# Patient Record
Sex: FEMALE | Race: WHITE | Employment: FULL TIME | URBAN - NONMETROPOLITAN AREA
[De-identification: names, ages, dates, MRNs, and addresses within clinical notes are randomized per-mention and may not be internally consistent; named-entity substitution may affect disease eponyms.]

---

## 2020-02-04 LAB
T3 TOTAL: 4.01
T4 FREE: 1.23
TSH SERPL DL<=0.05 MIU/L-ACNC: 0.25 UIU/ML

## 2020-10-21 LAB
ALBUMIN SERPL-MCNC: 4.3 G/DL
ALP BLD-CCNC: 53 U/L
ALT SERPL-CCNC: 8 U/L
ANION GAP SERPL CALCULATED.3IONS-SCNC: 10 MMOL/L
AST SERPL-CCNC: 11 U/L
BASOPHILS ABSOLUTE: 0 /ΜL
BASOPHILS RELATIVE PERCENT: 0.5 %
BILIRUB SERPL-MCNC: 0.3 MG/DL (ref 0.1–1.4)
BUN BLDV-MCNC: 9 MG/DL
CALCIUM SERPL-MCNC: 9 MG/DL
CHLORIDE BLD-SCNC: 105 MMOL/L
CO2: 27 MMOL/L
CREAT SERPL-MCNC: 0.5 MG/DL
EOSINOPHILS ABSOLUTE: 0.1 /ΜL
EOSINOPHILS RELATIVE PERCENT: 1.4 %
GFR CALCULATED: >60
GLUCOSE BLD-MCNC: 82 MG/DL
HCT VFR BLD CALC: 37.4 % (ref 36–46)
HEMOGLOBIN: 13.2 G/DL (ref 12–16)
LYMPHOCYTES ABSOLUTE: 2.8 /ΜL
LYMPHOCYTES RELATIVE PERCENT: 36.9 %
MCH RBC QN AUTO: 32.7 PG
MCHC RBC AUTO-ENTMCNC: 35.4 G/DL
MCV RBC AUTO: 92.3 FL
MONOCYTES ABSOLUTE: 0.6 /ΜL
MONOCYTES RELATIVE PERCENT: 7.7 %
NEUTROPHILS ABSOLUTE: 4 /ΜL
NEUTROPHILS RELATIVE PERCENT: 53.5 %
PDW BLD-RTO: 12.9 %
PLATELET # BLD: 173 K/ΜL
PMV BLD AUTO: 8.7 FL
POTASSIUM SERPL-SCNC: 3.6 MMOL/L
RBC # BLD: 4.05 10^6/ΜL
SODIUM BLD-SCNC: 138 MMOL/L
TOTAL PROTEIN: 6.6
WBC # BLD: 7.5 10^3/ML

## 2021-03-08 ENCOUNTER — OFFICE VISIT (OUTPATIENT)
Dept: FAMILY MEDICINE CLINIC | Age: 36
End: 2021-03-08
Payer: COMMERCIAL

## 2021-03-08 VITALS
SYSTOLIC BLOOD PRESSURE: 100 MMHG | OXYGEN SATURATION: 97 % | DIASTOLIC BLOOD PRESSURE: 62 MMHG | BODY MASS INDEX: 17.56 KG/M2 | TEMPERATURE: 97.7 F | HEART RATE: 94 BPM | WEIGHT: 105.4 LBS | RESPIRATION RATE: 16 BRPM | HEIGHT: 65 IN

## 2021-03-08 DIAGNOSIS — E07.9 THYROID DISEASE: ICD-10-CM

## 2021-03-08 DIAGNOSIS — E78.5 HYPERLIPIDEMIA, UNSPECIFIED HYPERLIPIDEMIA TYPE: ICD-10-CM

## 2021-03-08 DIAGNOSIS — R10.33 PERIUMBILICAL ABDOMINAL PAIN: ICD-10-CM

## 2021-03-08 DIAGNOSIS — C80.1 CANCER (HCC): ICD-10-CM

## 2021-03-08 DIAGNOSIS — J45.20 MILD INTERMITTENT ASTHMA WITHOUT COMPLICATION: ICD-10-CM

## 2021-03-08 DIAGNOSIS — Z11.59 NEED FOR HEPATITIS C SCREENING TEST: ICD-10-CM

## 2021-03-08 DIAGNOSIS — E83.51 HYPOCALCEMIA: ICD-10-CM

## 2021-03-08 DIAGNOSIS — Z00.00 ENCOUNTER FOR MEDICAL EXAMINATION TO ESTABLISH CARE: Primary | ICD-10-CM

## 2021-03-08 DIAGNOSIS — R53.83 OTHER FATIGUE: ICD-10-CM

## 2021-03-08 DIAGNOSIS — Z11.4 SCREENING FOR HIV (HUMAN IMMUNODEFICIENCY VIRUS): ICD-10-CM

## 2021-03-08 PROBLEM — G89.29 CHRONIC PAIN: Status: ACTIVE | Noted: 2021-03-08

## 2021-03-08 PROBLEM — N20.0 KIDNEY STONE: Status: ACTIVE | Noted: 2021-03-08

## 2021-03-08 PROBLEM — C73 MALIGNANT TUMOR OF THYROID GLAND (HCC): Status: ACTIVE | Noted: 2021-03-08

## 2021-03-08 PROCEDURE — 99204 OFFICE O/P NEW MOD 45 MIN: CPT | Performed by: NURSE PRACTITIONER

## 2021-03-08 RX ORDER — ALBUTEROL SULFATE 90 UG/1
2 AEROSOL, METERED RESPIRATORY (INHALATION) EVERY 4 HOURS PRN
Qty: 1 INHALER | Refills: 1 | Status: SHIPPED | OUTPATIENT
Start: 2021-03-08

## 2021-03-08 RX ORDER — PANTOPRAZOLE SODIUM 40 MG/1
TABLET, DELAYED RELEASE ORAL
COMMUNITY
Start: 2021-02-23 | End: 2021-03-15 | Stop reason: ALTCHOICE

## 2021-03-08 ASSESSMENT — PATIENT HEALTH QUESTIONNAIRE - PHQ9
2. FEELING DOWN, DEPRESSED OR HOPELESS: 0
SUM OF ALL RESPONSES TO PHQ QUESTIONS 1-9: 0
SUM OF ALL RESPONSES TO PHQ9 QUESTIONS 1 & 2: 0
SUM OF ALL RESPONSES TO PHQ QUESTIONS 1-9: 0
SUM OF ALL RESPONSES TO PHQ QUESTIONS 1-9: 0

## 2021-03-08 ASSESSMENT — ENCOUNTER SYMPTOMS
SHORTNESS OF BREATH: 0
ABDOMINAL PAIN: 0
EYE ITCHING: 0
COUGH: 0
CHEST TIGHTNESS: 0
CONSTIPATION: 0
CHOKING: 0
NAUSEA: 0
BACK PAIN: 0
EYE PAIN: 0
COLOR CHANGE: 0
ABDOMINAL DISTENTION: 0
VOICE CHANGE: 0
EYE DISCHARGE: 0
SINUS PRESSURE: 0
WHEEZING: 0
VOMITING: 0

## 2021-03-08 NOTE — PROGRESS NOTES
atraumatic. Right Ear: Tympanic membrane and external ear normal. Tympanic membrane is not injected or erythematous. Left Ear: Tympanic membrane and external ear normal. Tympanic membrane is not injected or erythematous. Nose: Nose normal. No congestion. Mouth/Throat:      Pharynx: Oropharynx is clear. Uvula midline. Eyes:      General:         Right eye: No discharge. Left eye: No discharge. Conjunctiva/sclera: Conjunctivae normal.      Pupils: Pupils are equal, round, and reactive to light. Neck:      Musculoskeletal: Full passive range of motion without pain, normal range of motion and neck supple. Thyroid: No thyromegaly. Trachea: Trachea normal.   Cardiovascular:      Rate and Rhythm: Normal rate and regular rhythm. Pulses: Normal pulses. Heart sounds: Normal heart sounds, S1 normal and S2 normal. No murmur. No friction rub. No gallop. Pulmonary:      Effort: Pulmonary effort is normal. No respiratory distress. Breath sounds: Normal breath sounds. No wheezing or rales. Chest:      Chest wall: No tenderness. Abdominal:      General: A surgical scar is present. Bowel sounds are normal. There is no distension. Palpations: Abdomen is soft. There is no mass. Tenderness: There is abdominal tenderness in the periumbilical area. There is no right CVA tenderness, left CVA tenderness, guarding or rebound. Comments: Small healing surgical incisions noted to abdomen, no sign of erythema or infection noted. Musculoskeletal: Normal range of motion. General: No tenderness or deformity. Lymphadenopathy:      Cervical: No cervical adenopathy. Skin:     General: Skin is warm and dry. Capillary Refill: Capillary refill takes less than 2 seconds. Comments: Abdomen with surgical incision. Neurological:      General: No focal deficit present. Mental Status: She is alert and oriented to person, place, and time. Deep Tendon Reflexes: Reflexes are normal and symmetric. An electronic signature was used to authenticate this note.     --SILVIO Mendez - CNP

## 2021-03-08 NOTE — PROGRESS NOTES
Health Maintenance Due   Topic Date Due    Hepatitis C screen  Never done    Varicella vaccine (1 of 2 - 2-dose childhood series) Never done    Pneumococcal 0-64 years Vaccine (1 of 1 - PPSV23) Never done    HIV screen  Never done    DTaP/Tdap/Td vaccine (1 - Tdap) Never done patient is due and declines.  Cervical cancer screen  Never done patient had completed frestyl.

## 2021-03-09 LAB
ANION GAP SERPL CALCULATED.3IONS-SCNC: 9 MEQ/L (ref 8–16)
BUN BLDV-MCNC: 9 MG/DL (ref 7–22)
CALCIUM SERPL-MCNC: 9 MG/DL (ref 8.5–10.5)
CHLORIDE BLD-SCNC: 102 MEQ/L (ref 98–111)
CHOLESTEROL, FASTING: 180 MG/DL (ref 100–199)
CO2: 27 MEQ/L (ref 23–33)
CREAT SERPL-MCNC: 0.6 MG/DL (ref 0.4–1.2)
GFR SERPL CREATININE-BSD FRML MDRD: > 90 ML/MIN/1.73M2
GLUCOSE BLD-MCNC: 82 MG/DL (ref 70–108)
HDLC SERPL-MCNC: 42 MG/DL
HEPATITIS C ANTIBODY: NEGATIVE
HIV AG/AB: NONREACTIVE
LDL CHOLESTEROL CALCULATED: 117 MG/DL
POTASSIUM SERPL-SCNC: 4.4 MEQ/L (ref 3.5–5.2)
SODIUM BLD-SCNC: 138 MEQ/L (ref 135–145)
TRIGLYCERIDE, FASTING: 107 MG/DL (ref 0–199)
VITAMIN D 25-HYDROXY: 12 NG/ML (ref 30–100)

## 2021-03-10 DIAGNOSIS — R79.89 LOW SERUM VITAMIN D: Primary | ICD-10-CM

## 2021-03-10 RX ORDER — MELATONIN
2000 DAILY
Qty: 180 TABLET | Refills: 0 | Status: SHIPPED | OUTPATIENT
Start: 2021-03-10 | End: 2022-04-29

## 2021-03-10 NOTE — RESULT ENCOUNTER NOTE
Please let Mague know that her Vitamin D level was very low at 12. I am sending an Rx for vitamin D for her to start daily.    All of her other labs looked great

## 2021-03-15 ENCOUNTER — OFFICE VISIT (OUTPATIENT)
Dept: FAMILY MEDICINE CLINIC | Age: 36
End: 2021-03-15
Payer: COMMERCIAL

## 2021-03-15 VITALS
BODY MASS INDEX: 17.36 KG/M2 | RESPIRATION RATE: 16 BRPM | OXYGEN SATURATION: 98 % | WEIGHT: 108 LBS | HEIGHT: 66 IN | DIASTOLIC BLOOD PRESSURE: 54 MMHG | TEMPERATURE: 97.9 F | HEART RATE: 97 BPM | SYSTOLIC BLOOD PRESSURE: 90 MMHG

## 2021-03-15 DIAGNOSIS — R31.9 HEMATURIA, UNSPECIFIED TYPE: Primary | ICD-10-CM

## 2021-03-15 DIAGNOSIS — Z90.49 STATUS POST CHOLECYSTECTOMY: ICD-10-CM

## 2021-03-15 DIAGNOSIS — R30.0 DYSURIA: ICD-10-CM

## 2021-03-15 LAB
BILIRUBIN, POC: NEGATIVE
BLOOD URINE, POC: NORMAL
CLARITY, POC: NORMAL
COLOR, POC: NORMAL
GLUCOSE URINE, POC: NEGATIVE
KETONES, POC: NEGATIVE
LEUKOCYTE EST, POC: NORMAL
NITRITE, POC: NEGATIVE
PH, POC: 6.5
PROTEIN, POC: NEGATIVE
SPECIFIC GRAVITY, POC: 1.02
UROBILINOGEN, POC: 0.2

## 2021-03-15 PROCEDURE — 81003 URINALYSIS AUTO W/O SCOPE: CPT | Performed by: FAMILY MEDICINE

## 2021-03-15 PROCEDURE — 99214 OFFICE O/P EST MOD 30 MIN: CPT | Performed by: FAMILY MEDICINE

## 2021-03-15 RX ORDER — SULFAMETHOXAZOLE AND TRIMETHOPRIM 800; 160 MG/1; MG/1
1 TABLET ORAL 2 TIMES DAILY
Qty: 10 TABLET | Refills: 0 | Status: SHIPPED | OUTPATIENT
Start: 2021-03-15 | End: 2021-03-20

## 2021-03-15 RX ORDER — MULTIVIT WITH CALCIUM,IRON,MIN 27MG-0.4MG
TABLET ORAL DAILY
COMMUNITY

## 2021-03-15 ASSESSMENT — ENCOUNTER SYMPTOMS
NAUSEA: 0
VOMITING: 0

## 2021-03-15 NOTE — PROGRESS NOTES
[address] [facility]  [address] [facility] [address]  Dept: [phone]  Dept Fax: [phone]  Loc: [phone]      Pacheco Ervin is a 43 y.o. female who presents todayfor Hematuria (x today ), Urinary Frequency (x today ), Dysuria (x today ), and Abdominal Pain (lower x today )      :   Urinary Tract Infection   This is a new problem. The current episode started today. The problem occurs every urination. The pain is at a severity of 3/10. The patient is experiencing no pain. There has been no fever. She is sexually active. There is no history of pyelonephritis. Associated symptoms include flank pain, frequency, hematuria and hesitancy. Pertinent negatives include no chills, discharge, nausea, possible pregnancy, sweats, urgency or vomiting. She has tried nothing for the symptoms. Her past medical history is significant for kidney stones (this does not feel like stone). Recently had gallbladder out. Endometriosis. patient is allergic to adhesive tape and morphine. Past MedicalHistory  Alyssia Stiles  has a past medical history of Asthma, Cancer ([facility] [address] Hyperlipidemia, Kidney stones, and Thyroid disease. Past Surgical History  The patient  has a past surgical history that includes Hysterectomy (2007); Lithotripsy (2007); Thyroidectomy (2014); EGD (2020); Colonoscopy; and Cholecystectomy. Family History  This patient's family history includes Cancer in her father and maternal grandfather; High Blood Pressure in her mother. Social History  Alyssia Stiles  reports that she has been smoking cigarettes. She has a 11.50 pack-year smoking history. She has never used smokeless tobacco. She reports current alcohol use. She reports that she does not use drugs.     Medications    Current Outpatient Medications:     Multiple Vitamins-Minerals (WOMENS ONE DAILY) TABS, Take by mouth daily, Disp: , Rfl:     sulfamethoxazole-trimethoprim (BACTRIM DS;SEPTRA DS) 800-160 MG per tablet, Take 1 tablet by mouth 2 times daily for 5 days, Disp: 10 tablet, Rfl: 0    albuterol sulfate HFA (VENTOLIN HFA) 108 (90 Base) MCG/ACT inhaler, Inhale 2 puffs into the lungs every 4 hours as needed for Wheezing, Disp: 1 Inhaler, Rfl: 1    levothyroxine (SYNTHROID) 100 MCG tablet, Take 100 mcg by mouth Daily, Disp: , Rfl:     ibuprofen (ADVIL;MOTRIN) 800 MG tablet, Take 800 mg by mouth every 6 hours as needed for Pain, Disp: , Rfl:     vitamin D3 (CHOLECALCIFEROL) 25 MCG (1000 UT) TABS tablet, Take 2 tablets by mouth daily (Patient not taking: Reported on 3/15/2021), Disp: 180 tablet, Rfl: 0    :     Review of Systems   Constitutional: Negative for chills and fever. Gastrointestinal: Negative for nausea and vomiting. Genitourinary: Positive for dysuria, flank pain, frequency, hematuria and hesitancy. Negative for urgency, vaginal bleeding and vaginal discharge. Neurological: Negative for dizziness.       :     Vitals:    03/15/21 1127   BP: (!) 90/54   Site: Left Upper Arm   Position: Sitting   Pulse: 97   Resp: 16   Temp: 97.9 °F (36.6 °C)   TempSrc: Temporal   SpO2: 98%   Weight: 108 lb (49 kg)   Height: 5' 5.5\" (1.664 m)       Physical Exam  Vitals signs reviewed. Constitutional:       Appearance: She is well-developed. HENT:      Head: Normocephalic and atraumatic. Eyes:      Conjunctiva/sclera: Conjunctivae normal.      Pupils: Pupils are equal, round, and reactive to light. Neck:      Musculoskeletal: Neck supple. Thyroid: No thyromegaly. Cardiovascular:      Rate and Rhythm: Normal rate and regular rhythm. Heart sounds: Normal heart sounds. Pulmonary:      Effort: Pulmonary effort is normal. No respiratory distress. Breath sounds: Normal breath sounds. Abdominal:      General: There is no distension. Palpations: Abdomen is soft. There is no mass. Tenderness: There is abdominal tenderness. There is no guarding or rebound. Hernia: No hernia is present. Comments: Status post recent cholecystectomy. Healing well. Mild induration at umbilicus between piercing site and port site without any drainage or warmth to suggest infection. Mild rash from surgical tape at this site also. Port placement sides clean, dry, and intact. Lymphadenopathy:      Cervical: No cervical adenopathy. Skin:     General: Skin is warm and dry. Findings: No rash. Neurological:      Mental Status: She is alert. Comments: No obvious focal deficit. Psychiatric:         Behavior: Behavior normal.         Assessment/Plan:   1. Hematuria, unspecified type  Symptoms and UA classic for UTI. Will treat empirically with bactrim and send off culture. Indications for prompt return and/or emergent presentation if worsening reviewed in detail. Can use pyridium in interim for discomfort if needed. - POCT Urinalysis No Micro (Auto)  - sulfamethoxazole-trimethoprim (BACTRIM DS;SEPTRA DS) 800-160 MG per tablet; Take 1 tablet by mouth 2 times daily for 5 days  Dispense: 10 tablet; Refill: 0  - Culture, Urine    2. Dysuria  As above. - sulfamethoxazole-trimethoprim (BACTRIM DS;SEPTRA DS) 800-160 MG per tablet; Take 1 tablet by mouth 2 times daily for 5 days  Dispense: 10 tablet; Refill: 0  - Culture, Urine    3. Status post cholecystectomy  Recent post op. Seems to be healing well. Follow-up with surgeon as planned about return to work instructions. Return if symptoms worsen or fail to improve. Also plans to return for pap with Geeta later this week. Unclear if pap indicated since status post hysterectomy with cervix removed. Would like to see records of prior paps and pathology report from hysterectomy. If pathology CIN2 or worse would need paps for 20 years post hysterectomy. If pathology less than this would no longer need paps.       Orders Placed  Orders Placed This Encounter   Procedures    Culture, Urine     Order Specific Question:   Specify (ex-cath, midstream, cysto, etc)? Answer:   mid-stream    POCT Urinalysis No Micro (Auto)       Prescriptions given/sent   Orders Placed This Encounter   Medications    sulfamethoxazole-trimethoprim (BACTRIM DS;SEPTRA DS) 800-160 MG per tablet     Sig: Take 1 tablet by mouth 2 times daily for 5 days     Dispense:  10 tablet     Refill:  0       Patient instructions given and reviewed. Discussed use, benefit, and side effects of prescribed medications. All patient questions answered. Pt voiced understanding.       Electronically signed by Elida Person MD on 3/15/2021at 11:49 AM

## 2021-03-15 NOTE — PATIENT INSTRUCTIONS
Patient Education        Urinary Tract Infection (UTI) in Women: Care Instructions  Overview     A urinary tract infection, or UTI, is a general term for an infection anywhere between the kidneys and the urethra (where urine comes out). Most UTIs are bladder infections. They often cause pain or burning when you urinate. UTIs are caused by bacteria and can be cured with antibiotics. Be sure to complete your treatment so that the infection does not get worse. Follow-up care is a key part of your treatment and safety. Be sure to make and go to all appointments, and call your doctor if you are having problems. It's also a good idea to know your test results and keep a list of the medicines you take. How can you care for yourself at home? · Take your antibiotics as directed. Do not stop taking them just because you feel better. You need to take the full course of antibiotics. · Drink extra water and other fluids for the next day or two. This will help make the urine less concentrated and help wash out the bacteria that are causing the infection. (If you have kidney, heart, or liver disease and have to limit fluids, talk with your doctor before you increase the amount of fluids you drink.)  · Avoid drinks that are carbonated or have caffeine. They can irritate the bladder. · Urinate often. Try to empty your bladder each time. · To relieve pain, take a hot bath or lay a heating pad set on low over your lower belly or genital area. Never go to sleep with a heating pad in place. To prevent UTIs  · Drink plenty of water each day. This helps you urinate often, which clears bacteria from your system. (If you have kidney, heart, or liver disease and have to limit fluids, talk with your doctor before you increase the amount of fluids you drink.)  · Urinate when you need to. · If you are sexually active, urinate right after you have sex. · Change sanitary pads often.   · Avoid douches, bubble baths, feminine hygiene

## 2021-03-17 ENCOUNTER — PROCEDURE VISIT (OUTPATIENT)
Dept: FAMILY MEDICINE CLINIC | Age: 36
End: 2021-03-17
Payer: COMMERCIAL

## 2021-03-17 VITALS
OXYGEN SATURATION: 99 % | DIASTOLIC BLOOD PRESSURE: 66 MMHG | SYSTOLIC BLOOD PRESSURE: 118 MMHG | HEART RATE: 88 BPM | HEIGHT: 66 IN | BODY MASS INDEX: 17.36 KG/M2 | WEIGHT: 108 LBS | TEMPERATURE: 97.5 F | RESPIRATION RATE: 18 BRPM

## 2021-03-17 DIAGNOSIS — N76.0 BACTERIAL VAGINOSIS: Primary | ICD-10-CM

## 2021-03-17 DIAGNOSIS — Z01.411 ENCOUNTER FOR GYNECOLOGICAL EXAMINATION WITH ABNORMAL FINDING: ICD-10-CM

## 2021-03-17 DIAGNOSIS — B96.89 BACTERIAL VAGINOSIS: Primary | ICD-10-CM

## 2021-03-17 LAB
ORGANISM: ABNORMAL
URINE CULTURE, ROUTINE: ABNORMAL

## 2021-03-17 PROCEDURE — 99214 OFFICE O/P EST MOD 30 MIN: CPT | Performed by: NURSE PRACTITIONER

## 2021-03-17 RX ORDER — METRONIDAZOLE 7.5 MG/G
1 GEL VAGINAL DAILY
Qty: 1 TUBE | Refills: 1 | Status: SHIPPED | OUTPATIENT
Start: 2021-03-17 | End: 2021-03-22

## 2021-03-17 NOTE — PROGRESS NOTES
Social History     Tobacco Use    Smoking status: Current Every Day Smoker     Packs/day: 0.50     Years: 23.00     Pack years: 11.50     Types: Cigarettes    Smokeless tobacco: Never Used   Substance Use Topics    Alcohol use: Yes     Comment: rarely        Vitals:    03/17/21 1148   BP: 118/66   Site: Left Upper Arm   Position: Sitting   Cuff Size: Medium Adult   Pulse: 88   Resp: 18   Temp: 97.5 °F (36.4 °C)   TempSrc: Temporal   SpO2: 99%   Weight: 108 lb (49 kg)   Height: 5' 5.5\" (1.664 m)     Estimated body mass index is 17.7 kg/m² as calculated from the following:    Height as of this encounter: 5' 5.5\" (1.664 m). Weight as of this encounter: 108 lb (49 kg). Physical Exam  Vitals signs and nursing note reviewed. Constitutional:       General: She is not in acute distress. Appearance: She is well-developed. She is not diaphoretic. HENT:      Head: Normocephalic and atraumatic. Right Ear: Tympanic membrane and external ear normal. Tympanic membrane is not injected or erythematous. Left Ear: Tympanic membrane and external ear normal. Tympanic membrane is not injected or erythematous. Nose: Nose normal.      Mouth/Throat:      Pharynx: Uvula midline. Eyes:      General:         Right eye: No discharge. Left eye: No discharge. Conjunctiva/sclera: Conjunctivae normal.      Pupils: Pupils are equal, round, and reactive to light. Neck:      Musculoskeletal: Full passive range of motion without pain, normal range of motion and neck supple. Thyroid: No thyromegaly. Trachea: Trachea normal.   Cardiovascular:      Rate and Rhythm: Normal rate and regular rhythm. Pulses: Normal pulses. Heart sounds: Normal heart sounds, S1 normal and S2 normal. No murmur. No friction rub. No gallop. Pulmonary:      Effort: Pulmonary effort is normal. No respiratory distress. Breath sounds: Normal breath sounds. No wheezing or rales.    Chest:      Chest wall: No tenderness. Abdominal:      General: Bowel sounds are normal. There is no distension. Palpations: Abdomen is soft. There is no mass. Tenderness: There is no abdominal tenderness. There is no guarding or rebound. Hernia: There is no hernia in the left inguinal area or right inguinal area. Genitourinary:     Exam position: Supine. Pubic Area: No rash or pubic lice. Labia:         Right: No rash, tenderness, lesion or injury. Left: No rash, tenderness, lesion or injury. Vagina: Vaginal discharge present. Adnexa: Right adnexa normal and left adnexa normal.      Rectum: Normal.      Comments: White discharge noted to vaginal canal.   Musculoskeletal: Normal range of motion. General: No tenderness or deformity. Lymphadenopathy:      Cervical: No cervical adenopathy. Lower Body: No right inguinal adenopathy. No left inguinal adenopathy. Skin:     General: Skin is warm and dry. Capillary Refill: Capillary refill takes less than 2 seconds. Neurological:      Mental Status: She is alert and oriented to person, place, and time. Deep Tendon Reflexes: Reflexes are normal and symmetric. ASSESSMENT/PLAN:    1. Encounter for gynecological examination with abnormal finding  See below     2. Bacterial vaginosis  Patient with complaints of vaginal burning, completed antibiotic, previously written. Exam consistent with BV, rx for metrogel written. - metroNIDAZOLE (METROGEL) 0.75 % vaginal gel; Place 1 Applicatorful vaginally daily for 5 days  Dispense: 1 Tube; Refill: 1  - Culture, Aerobic and Anaerobic        Return if symptoms worsen or fail to improve. An electronic signature was used to authenticate this note.     --SILVIO Vines - CNP on 3/26/2021 at 11:59 AM

## 2021-03-17 NOTE — PATIENT INSTRUCTIONS
Patient Education        Bacterial Vaginosis: Care Instructions  Overview     Bacterial vaginosis is a type of vaginal infection. It is caused by excess growth of certain bacteria that are normally found in the vagina. Symptoms can include itching, swelling, pain when you urinate or have sex, and a gray or yellow discharge with a \"fishy\" odor. It is not considered an infection that is spread through sexual contact. Symptoms can be annoying and uncomfortable. But bacterial vaginosis does not usually cause other health problems. However, if you have it while you are pregnant, it can cause complications. While the infection may go away on its own, most doctors use antibiotics to treat it. You may have been prescribed pills or vaginal cream. With treatment, bacterial vaginosis usually clears up in 5 to 7 days. Follow-up care is a key part of your treatment and safety. Be sure to make and go to all appointments, and call your doctor if you are having problems. It's also a good idea to know your test results and keep a list of the medicines you take. How can you care for yourself at home? · Take your antibiotics as directed. Do not stop taking them just because you feel better. You need to take the full course of antibiotics. · Do not eat or drink anything that contains alcohol if you are taking metronidazole or tinidazole. · Keep using your medicine if you start your period. Use pads instead of tampons while using a vaginal cream or suppository. Tampons can absorb the medicine. · Wear loose cotton clothing. Do not wear nylon and other materials that hold body heat and moisture close to the skin. · Do not scratch. Relieve itching with a cold pack or a cool bath. · Do not wash your vaginal area more than once a day. Use plain water or a mild, unscented soap. Do not douche. When should you call for help?   Watch closely for changes in your health, and be sure to contact your doctor if:    · You have unexpected vaginal bleeding.     · You have a fever.     · You have new or increased pain in your vagina or pelvis.     · You are not getting better after 1 week.     · Your symptoms return after you finish the course of your medicine. Where can you learn more? Go to https://chten.healthIlluminOss [email or web address]. org and sign in to your [employer] account. Enter M084 in the [facility] box to learn more about \"Bacterial Vaginosis: Care Instructions. \"     If you do not have an account, please click on the \"Sign Up Now\" link. Current as of: July 17, 2020               Content Version: 12.8  © 2006-2021 [employer]. Care instructions adapted under license by [facility] ([facility]). If you have questions about a medical condition or this instruction, always ask your healthcare professional. [address] any warranty or liability for your use of this information. Patient Education        Bacterial Vaginosis: Care Instructions  Overview     Bacterial vaginosis is a type of vaginal infection. It is caused by excess growth of certain bacteria that are normally found in the vagina. Symptoms can include itching, swelling, pain when you urinate or have sex, and a gray or yellow discharge with a \"fishy\" odor. It is not considered an infection that is spread through sexual contact. Symptoms can be annoying and uncomfortable. But bacterial vaginosis does not usually cause other health problems. However, if you have it while you are pregnant, it can cause complications. While the infection may go away on its own, most doctors use antibiotics to treat it. You may have been prescribed pills or vaginal cream. With treatment, bacterial vaginosis usually clears up in 5 to 7 days. Follow-up care is a key part of your treatment and safety. Be sure to make and go to all appointments, and call your doctor if you are having problems.  It's also a good idea to know your test results and keep a list of the medicines you take. How can you care for yourself at home? · Take your antibiotics as directed. Do not stop taking them just because you feel better. You need to take the full course of antibiotics. · Do not eat or drink anything that contains alcohol if you are taking metronidazole or tinidazole. · Keep using your medicine if you start your period. Use pads instead of tampons while using a vaginal cream or suppository. Tampons can absorb the medicine. · Wear loose cotton clothing. Do not wear nylon and other materials that hold body heat and moisture close to the skin. · Do not scratch. Relieve itching with a cold pack or a cool bath. · Do not wash your vaginal area more than once a day. Use plain water or a mild, unscented soap. Do not douche. When should you call for help? Watch closely for changes in your health, and be sure to contact your doctor if:    · You have unexpected vaginal bleeding.     · You have a fever.     · You have new or increased pain in your vagina or pelvis.     · You are not getting better after 1 week.     · Your symptoms return after you finish the course of your medicine. Where can you learn more? Go to https://Cloud[employer]fusion.[employer]. org and sign in to your [employer] account. Enter G669 in the [employer] box to learn more about \"Bacterial Vaginosis: Care Instructions. \"     If you do not have an account, please click on the \"Sign Up Now\" link. Current as of: July 17, 2020               Content Version: 12.8  © 2006-2021 Healthwise, [facility]. Care instructions adapted under license by [facility] ([facility]). If you have questions about a medical condition or this instruction, always ask your healthcare professional. [place] [address] any warranty or liability for your use of this information.

## 2021-03-22 ENCOUNTER — TELEPHONE (OUTPATIENT)
Dept: FAMILY MEDICINE CLINIC | Age: 36
End: 2021-03-22

## 2021-03-22 LAB
AEROBIC CULTURE: NORMAL
ANAEROBIC CULTURE: NORMAL
GRAM STAIN RESULT: NORMAL

## 2021-03-26 ASSESSMENT — ENCOUNTER SYMPTOMS
DIARRHEA: 0
FACIAL SWELLING: 0
SORE THROAT: 0
BACK PAIN: 0

## 2021-04-06 ENCOUNTER — OFFICE VISIT (OUTPATIENT)
Dept: FAMILY MEDICINE CLINIC | Age: 36
End: 2021-04-06
Payer: COMMERCIAL

## 2021-04-06 ENCOUNTER — TELEPHONE (OUTPATIENT)
Dept: FAMILY MEDICINE CLINIC | Age: 36
End: 2021-04-06

## 2021-04-06 VITALS
WEIGHT: 107.4 LBS | DIASTOLIC BLOOD PRESSURE: 62 MMHG | HEART RATE: 86 BPM | TEMPERATURE: 98.6 F | SYSTOLIC BLOOD PRESSURE: 104 MMHG | RESPIRATION RATE: 16 BRPM | BODY MASS INDEX: 17.6 KG/M2 | OXYGEN SATURATION: 99 %

## 2021-04-06 DIAGNOSIS — R30.0 DYSURIA: ICD-10-CM

## 2021-04-06 DIAGNOSIS — R82.71 BACTERIA IN URINE: Primary | ICD-10-CM

## 2021-04-06 DIAGNOSIS — N28.89 MULTIPLE PUNCTATE CALCIFICATIONS OF KIDNEY: ICD-10-CM

## 2021-04-06 DIAGNOSIS — R10.9 RIGHT FLANK PAIN: ICD-10-CM

## 2021-04-06 DIAGNOSIS — R31.9 HEMATURIA, UNSPECIFIED TYPE: ICD-10-CM

## 2021-04-06 LAB
BILIRUBIN URINE: ABNORMAL
BLOOD URINE, POC: ABNORMAL
CHARACTER, URINE: ABNORMAL
COLOR, URINE: ABNORMAL
GLUCOSE URINE: NEGATIVE MG/DL
KETONES, URINE: ABNORMAL
LEUKOCYTE CLUMPS, URINE: ABNORMAL
NITRITE, URINE: NEGATIVE
PH, URINE: 5.5 (ref 5–9)
PROTEIN, URINE: ABNORMAL MG/DL
SPECIFIC GRAVITY, URINE: 1.02 (ref 1–1.03)
UROBILINOGEN, URINE: 0.2 EU/DL (ref 0–1)

## 2021-04-06 PROCEDURE — 99214 OFFICE O/P EST MOD 30 MIN: CPT | Performed by: NURSE PRACTITIONER

## 2021-04-06 RX ORDER — CIPROFLOXACIN 500 MG/1
500 TABLET, FILM COATED ORAL 2 TIMES DAILY
Qty: 6 TABLET | Refills: 0 | Status: SHIPPED | OUTPATIENT
Start: 2021-04-06 | End: 2021-04-09

## 2021-04-06 ASSESSMENT — ENCOUNTER SYMPTOMS
RECTAL PAIN: 0
ABDOMINAL PAIN: 1
DIARRHEA: 0
VOMITING: 0
SORE THROAT: 0
NAUSEA: 0
CONSTIPATION: 0
SHORTNESS OF BREATH: 0
COUGH: 0

## 2021-04-06 NOTE — PROGRESS NOTES
Health Maintenance Due   Topic Date Due    DTaP/Tdap/Td vaccine (1 - Tdap) Never done patient is due - declines    Cervical cancer screen  Never done - patient had this completed here- 3 weeks ago?

## 2021-04-06 NOTE — TELEPHONE ENCOUNTER
LM for patient to return phone call.  Patient is scheduled for CT tomorrow at 12:45pm. Patient needs to arrive at 11:10am.

## 2021-04-06 NOTE — PROGRESS NOTES
Benjy Omalley (:  1985) is a 43 y.o. female,Established patient, here for evaluation of the following chief complaint(s):  Follow-up (UTI)      ASSESSMENT/PLAN:  1. Bacteria in urine  Urine dipstick shows negative for all components, positive for leukocytes, red blood cells. Treating with Cipro. KUB showed punctate calcifications kidney  Non toxic appearing and in no acute distress.   -     POCT Urinalysis No Micro (Auto)  -     Culture, Urine  -     ciprofloxacin (CIPRO) 500 MG tablet; Take 1 tablet by mouth 2 times daily for 3 days, Disp-6 tablet, R-0Normal  2. Hematuria, unspecified type  -     Urinalysis With Microscopic; Future  -     XR ABDOMEN (KUB) (SINGLE AP VIEW); Future  -     CT ABDOMEN PELVIS WO CONTRAST Additional Contrast? Radiologist Recommendation; Future  3. Right flank pain  -     XR ABDOMEN (KUB) (SINGLE AP VIEW); Future  -     CT ABDOMEN PELVIS WO CONTRAST Additional Contrast? Radiologist Recommendation; Future  4. Dysuria  -     Culture, Urine  5. Multiple punctate calcifications of kidney  KUb revealed: KUB showed punctate calcifications kidney  Ordering a CT abdomen for further evaluation   -     CT ABDOMEN PELVIS WO CONTRAST Additional Contrast? Radiologist Recommendation; Future      Return in about 1 week (around 2021), or if symptoms worsen or fail to improve. SUBJECTIVE/OBJECTIVE:  Treated with bactrim and x 5 days 3/15 for UTI. Symptoms resolved and then started again last week Thursday and Friday. Felt lower abdominal pressure. Early this am started having right flank pain. Now is having urgency. Urine is pink tinged. Has also been having pain with intercourse. Denies vaginal discharge. Had vaginal exam 3/17, treated for BV with metrogel x 5 days. Has had kidney stones in the past, years ago. Hx of hysterectomy, carmelo ovaries remain. Recent surgery in February for gallbladder removal with course of augmentin according to patient. Breath sounds: Normal breath sounds. No wheezing or rales. Chest:      Chest wall: No tenderness. Abdominal:      General: Abdomen is flat. Bowel sounds are normal. There is no distension. Palpations: Abdomen is soft. There is no mass. Tenderness: There is abdominal tenderness in the right lower quadrant and suprapubic area. There is right CVA tenderness. There is no left CVA tenderness, guarding or rebound. Musculoskeletal: Normal range of motion. General: No tenderness or deformity. Right lower leg: No edema. Left lower leg: No edema. Lymphadenopathy:      Cervical: No cervical adenopathy. Skin:     General: Skin is warm and dry. Capillary Refill: Capillary refill takes less than 2 seconds. Neurological:      General: No focal deficit present. Mental Status: She is alert and oriented to person, place, and time. Deep Tendon Reflexes: Reflexes are normal and symmetric. Psychiatric:         Mood and Affect: Mood normal.                 An electronic signature was used to authenticate this note.     --Jaimee Steel, SILVIO - CNP

## 2021-04-07 ENCOUNTER — HOSPITAL ENCOUNTER (OUTPATIENT)
Dept: CT IMAGING | Age: 36
Discharge: HOME | End: 2021-04-07
Payer: COMMERCIAL

## 2021-04-07 DIAGNOSIS — R31.9 HEMATURIA, UNSPECIFIED TYPE: ICD-10-CM

## 2021-04-07 DIAGNOSIS — R10.9 RIGHT FLANK PAIN: ICD-10-CM

## 2021-04-07 DIAGNOSIS — N28.89 MULTIPLE PUNCTATE CALCIFICATIONS OF KIDNEY: ICD-10-CM

## 2021-04-07 PROCEDURE — 74176 CT ABD & PELVIS W/O CONTRAST: CPT

## 2021-04-08 LAB
ORGANISM: ABNORMAL
URINE CULTURE, ROUTINE: ABNORMAL

## 2021-04-08 NOTE — RESULT ENCOUNTER NOTE
Please notify Mague that her CT showed some accumulation of calcium (build up of a stone) in her right ureter. And a left ovarian cyst. The cyst will likely resolve without any issues. Still awaiting the final result on her urine culture, but looks like she does in fact have a UTI. Has she seen a Urologist in the past? If not, does she have a preference?

## 2021-04-12 DIAGNOSIS — N28.89 MULTIPLE PUNCTATE CALCIFICATIONS OF KIDNEY: Primary | ICD-10-CM

## 2021-04-21 ENCOUNTER — OFFICE VISIT (OUTPATIENT)
Dept: UROLOGY | Age: 36
End: 2021-04-21
Payer: COMMERCIAL

## 2021-04-21 ENCOUNTER — TELEPHONE (OUTPATIENT)
Dept: UROLOGY | Age: 36
End: 2021-04-21

## 2021-04-21 VITALS
DIASTOLIC BLOOD PRESSURE: 60 MMHG | WEIGHT: 109 LBS | HEIGHT: 66 IN | BODY MASS INDEX: 17.52 KG/M2 | SYSTOLIC BLOOD PRESSURE: 104 MMHG

## 2021-04-21 DIAGNOSIS — Z01.818 PREOP TESTING: ICD-10-CM

## 2021-04-21 DIAGNOSIS — N20.0 KIDNEY STONES: Primary | ICD-10-CM

## 2021-04-21 DIAGNOSIS — R10.9 FLANK PAIN: ICD-10-CM

## 2021-04-21 DIAGNOSIS — N13.2 URETERAL STONE WITH HYDRONEPHROSIS: ICD-10-CM

## 2021-04-21 LAB
BILIRUBIN URINE: NEGATIVE
BLOOD URINE, POC: NORMAL
CHARACTER, URINE: CLEAR
COLOR, URINE: YELLOW
GLUCOSE URINE: NEGATIVE MG/DL
KETONES, URINE: NEGATIVE
LEUKOCYTE CLUMPS, URINE: NEGATIVE
NITRITE, URINE: NEGATIVE
PH, URINE: 7 (ref 5–9)
PROTEIN, URINE: NEGATIVE MG/DL
SPECIFIC GRAVITY, URINE: >= 1.03 (ref 1–1.03)
UROBILINOGEN, URINE: 0.2 EU/DL (ref 0–1)

## 2021-04-21 PROCEDURE — 99204 OFFICE O/P NEW MOD 45 MIN: CPT | Performed by: UROLOGY

## 2021-04-21 PROCEDURE — 81003 URINALYSIS AUTO W/O SCOPE: CPT | Performed by: UROLOGY

## 2021-04-21 NOTE — TELEPHONE ENCOUNTER
Patient is scheduled for surgery with Dr. Mika Pinon on 05- at 12:30pm with an arrival of 10:30am. Preop orders and instructions given to patient. Surgery consent signed.

## 2021-04-21 NOTE — PROGRESS NOTES
Dispense Refill    Multiple Vitamins-Minerals (WOMENS ONE DAILY) TABS Take by mouth daily      vitamin D3 (CHOLECALCIFEROL) 25 MCG (1000 UT) TABS tablet Take 2 tablets by mouth daily 180 tablet 0    albuterol sulfate HFA (VENTOLIN HFA) 108 (90 Base) MCG/ACT inhaler Inhale 2 puffs into the lungs every 4 hours as needed for Wheezing 1 Inhaler 1    ibuprofen (ADVIL;MOTRIN) 800 MG tablet Take 800 mg by mouth every 6 hours as needed for Pain         Adhesive tape and Morphine  Social History     Tobacco Use   Smoking Status Current Every Day Smoker    Packs/day: 0.50    Years: 23.00    Pack years: 11.50    Types: Cigarettes   Smokeless Tobacco Never Used       Social History     Substance and Sexual Activity   Alcohol Use Yes    Comment: rarely       REVIEW OF SYSTEMS:  Constitutional: negative  Eyes: negative  Respiratory: negative  Cardiovascular: negative  Gastrointestinal: negative  Genitourinary: negative except for what is in HPI  Musculoskeletal: negative  Skin: negative   Neurological: negative  Hematological/Lymphatic: negative  Psychological: negative    Physical Exam:    This a 43 y.o. female    There were no vitals filed for this visit. Constitutional: Patient in no acute distress. Neuro: Alert and oriented to person, place and time. Psych: mood and affect normal  HEENT negative  Lungs: Respiratory effort is normal  Cardiovascular: Normal peripheral pulses  Abdomen: Soft, non-tender, non-distended with no CVA, flank pain or hepatosplenomegaly. No hernias. Kidneys normal.  Lymphatics: No palpable lymphadenopathy. Bladder non-tender and not distended. Assessment and Plan      1. Kidney stones    2. Ureteral stone with hydronephrosis    3. Flank pain           Plan:      No follow-ups on file. Cystoscopy, right ureteroscopy with laser lithotripsy, and right ureteral stent placement.            Dr. Devon Guerrero MD

## 2021-04-21 NOTE — TELEPHONE ENCOUNTER
DO NOT TAKE ASPIRIN, IBUPROFEN, MOTRIN-LIKE DRUGS AND ANY MULTIVITAMINS OR OVER THE COUNTER SUPPLEMENTS 5 DAYS PRIOR TO SURGERY. Dee Larios 1985 Diagnosis: Kidney stone    Surgical Physician: Dr. Archana Ray have been scheduled for the procedure marked below:      Surgery: Cystoscopy, right ureteroscopy, laser lithotripsy, basket retrieval of stone fragments, and possible right ureteral stent placement          Date: 05-     Anesthesia: Anesthesiologist (General/Spinal)     Place of Service: [facility] Second Floor Same Day Surgery         Arrive to same day surgery by:  10:30am  (Surgery time is subject to change)      INSTRUCTIONS AS MARKED BELOW:    1. DO NOT eat or drink anything after midnight before surgery. 2. We prefer you shower or bathe with an antibacterial soap (Dial) the morning of surgery. 3.  Please ensure to have a  with you to transport you home. 4.  Please bring a current medication list, photo ID and insurance card(s) with you  5. Okay to take Tylenol  6. The office will call you in 1-2 days after your procedure to schedule a follow up. DATE SENSITIVE TESTING    On 04- have a Chest x-ray and urine culture done    05- have Covid-19 screening done.     (Covid-19 screening is date sensitive and can only be done 5 to 7 days prior to your procedure)    Date: 4/21/2021

## 2021-04-21 NOTE — TELEPHONE ENCOUNTER
SURGERY [address] [place] [address] [address] [facility], [address]      Phone *[phone] *[phone]   Surgical Scheduling Direct Line Phone *[phone] Fax *[phone]      Linden Antonio 1985 female    [address]  [facility]   Marital Status:          Home Phone: [phone]      Cell Phone:    Telephone Information:   Mobile [phone]          Surgeon: Dr. Jaspreet Betts  Surgery Date: 05-   Time: 12:30pm    Procedure: Cystoscopy, right ureteroscopy, laser lithotripsy, basket retrieval of stone fragments, and possible right ureteral stent placement    Diagnosis: Kidney stones     Important Medical History: In Epic    Special Inst/Equip:     CPT Codes:    00997  Latex Allergy:  No     Cardiac Device:  No     Anesthesia:  Anesthesiologist (General/Spinal)          Admission Type:  Same Day                             Admit Prior to Day of Surgery: No    Case Location:  Main OR           Preadmission Testing:Phone Call              PAT Date and Time:______________________________________________________    PAT Confirmation #: ______________________________________________________    Post Op Visit: ___________________________________________________________    Need Preop Cardiac Clearance: No    Does Patient have Cardiologist/physician?      None    Surgery Confirmation #: __________________________________________________    : ________________________   Date: __________________________     [employer] Name: [employer]

## 2021-04-21 NOTE — LETTER
[facility]  [address] [place] [address]  Phone: [phone]  Fax: [phone]    Olimpia Thompson MD        April 21, 2021     Patient: Linden Alvarez   YOB: 1985   Date of Visit: 4/21/2021       To Whom It May Concern:    Please excuse Gemma Bodily on 05- through 05- for treatment for a kidney stone     If you have any questions or concerns, please don't hesitate to call.     Sincerely,            Olimpia Thompson MD

## 2021-04-28 ENCOUNTER — HOSPITAL ENCOUNTER (OUTPATIENT)
Age: 36
Discharge: HOME | End: 2021-04-28
Payer: COMMERCIAL

## 2021-04-28 ENCOUNTER — HOSPITAL ENCOUNTER (OUTPATIENT)
Dept: GENERAL RADIOLOGY | Age: 36
Discharge: HOME | End: 2021-04-28
Payer: COMMERCIAL

## 2021-04-28 DIAGNOSIS — N20.0 KIDNEY STONES: ICD-10-CM

## 2021-04-28 DIAGNOSIS — Z01.818 PREOP TESTING: ICD-10-CM

## 2021-04-28 DIAGNOSIS — R10.9 FLANK PAIN: ICD-10-CM

## 2021-04-28 PROCEDURE — 87086 URINE CULTURE/COLONY COUNT: CPT

## 2021-04-28 PROCEDURE — 71046 X-RAY EXAM CHEST 2 VIEWS: CPT

## 2021-04-29 ENCOUNTER — TELEPHONE (OUTPATIENT)
Dept: UROLOGY | Age: 36
End: 2021-04-29

## 2021-04-29 DIAGNOSIS — N20.0 KIDNEY STONES: ICD-10-CM

## 2021-04-29 DIAGNOSIS — Z01.818 PREOP TESTING: Primary | ICD-10-CM

## 2021-04-29 LAB
ORGANISM: ABNORMAL
URINE CULTURE, ROUTINE: ABNORMAL

## 2021-04-29 NOTE — TELEPHONE ENCOUNTER
----- Message from Cathryn Files, APRN - CNP sent at 4/29/2021 10:00 AM EDT -----  Repeat urine culture beginning of next week.

## 2021-04-29 NOTE — TELEPHONE ENCOUNTER
Alisha Hernández on [facility] advised patient needs to have the urine culture repeated beginning of next week. He voiced understanding.

## 2021-05-03 ENCOUNTER — NURSE ONLY (OUTPATIENT)
Dept: FAMILY MEDICINE CLINIC | Age: 36
End: 2021-05-03

## 2021-05-03 DIAGNOSIS — Z01.818 PREOP TESTING: ICD-10-CM

## 2021-05-03 DIAGNOSIS — N20.0 KIDNEY STONES: ICD-10-CM

## 2021-05-05 ENCOUNTER — HOSPITAL ENCOUNTER (OUTPATIENT)
Age: 36
Discharge: HOME | End: 2021-05-05
Payer: COMMERCIAL

## 2021-05-05 ENCOUNTER — TELEPHONE (OUTPATIENT)
Dept: UROLOGY | Age: 36
End: 2021-05-05

## 2021-05-05 LAB
INFLUENZA A: NOT DETECTED
INFLUENZA B: NOT DETECTED
SARS-COV-2 RNA, RT PCR: NOT DETECTED
URINE CULTURE, ROUTINE: NORMAL

## 2021-05-05 PROCEDURE — 87636 SARSCOV2 & INF A&B AMP PRB: CPT

## 2021-05-05 NOTE — TELEPHONE ENCOUNTER
----- Message from SILVIO Singh CNP sent at 5/5/2021  7:45 AM EDT -----  Urine negative for infection

## 2021-05-06 NOTE — PROGRESS NOTES
In preparation for their surgical procedure above patient was screened for Obstructive Sleep Apnea (DINORA) using the STOP-Bang Questionnaire by the Pre-Admission Testing department. This is a pre-surgical screening tool for patient safety and serves as a recommendation, this WILL NOT cause cancellation of surgery. STOP-Bang Questionnaire  * Do you currently see a pulmonologist?  No     If yes STOP, do not complete. Patient follows with Dr.     1.  Do you snore loudly (able to be heard in the next room)? No    2. Do you often feel tired or sleepy during the daytime? No       3. Has anyone ever told you that you stop breathing during your sleep? No    4. Do you have or are you being treated for high blood pressure? No      5. BMI more than 35? BMI (Calculated): 17.6        No    6. Age over 48 years? 43 y.o. No    7. Neck Circumference greater than 17 inches for male or 16 inches for female? Measured           (visits only)            Not Applicable    8. Gender Male? No      TOTAL SCORE:     DINORA - Low Risk : Yes to 0 - 2 questions  DINORA - Intermediate Risk : Yes to0 3 - 4 questions  DINORA - High Risk : Yes to 5 - 8 questions    Adapted from:   STOP Questionnaire: A Tool to Screen Patients for Obstructive Sleep Apnea   Max Monday, F.R.C.P.C., Aarti Travis M.B.B.S., Venkat Short M.D., Charlie Olsen. Kiko Marvin, Ph.D., BLAKE Carver.B.B.S., Zeyad Hernandez, M.Sc., Mer Vanessa M.D., Елена Perea. NATALI HdezC.P.C.    Anesthesiology 2008; 966:128-30 Copyright 2008, the [address] of Anesthesiologists, [facility].   ----------------------------------------------------------------------------------------------------------------

## 2021-05-06 NOTE — PROGRESS NOTES
Following instructions given to patient, who states understanding:    NPO after midnight  [employer] and 's license  Wear comfortable clean clothing  Do not bring jewelry   Shower night before and morning of surgery with a liquid antibacterial soap  Bring medications in original bottles  Follow all instructions given by your physician   needed at discharge  Call [phone] for any questions  Report to SDS on 2nd floor  If you would become ill prior to surgery, please call the surgeon  May have a visitor with you, we request that you limit to 2 visitors in pre-op area  Please bring and wear mask  You will be receiving a phone call one or two days before surgery to review covid screening exposure

## 2021-05-10 ENCOUNTER — TELEPHONE (OUTPATIENT)
Dept: UROLOGY | Age: 36
End: 2021-05-10

## 2021-05-10 ENCOUNTER — PREP FOR PROCEDURE (OUTPATIENT)
Dept: UROLOGY | Age: 36
End: 2021-05-10

## 2021-05-10 RX ORDER — SODIUM CHLORIDE 9 MG/ML
INJECTION, SOLUTION INTRAVENOUS CONTINUOUS
Status: CANCELLED | OUTPATIENT
Start: 2021-05-12

## 2021-05-12 ENCOUNTER — HOSPITAL ENCOUNTER (OUTPATIENT)
Age: 36
Setting detail: OUTPATIENT SURGERY
Discharge: HOME | End: 2021-05-12
Attending: UROLOGY | Admitting: UROLOGY
Payer: COMMERCIAL

## 2021-05-12 ENCOUNTER — ANESTHESIA (OUTPATIENT)
Dept: OPERATING ROOM | Age: 36
End: 2021-05-12
Payer: COMMERCIAL

## 2021-05-12 ENCOUNTER — ANESTHESIA EVENT (OUTPATIENT)
Dept: OPERATING ROOM | Age: 36
End: 2021-05-12
Payer: COMMERCIAL

## 2021-05-12 VITALS
TEMPERATURE: 98.2 F | RESPIRATION RATE: 18 BRPM | DIASTOLIC BLOOD PRESSURE: 55 MMHG | HEART RATE: 65 BPM | BODY MASS INDEX: 16.84 KG/M2 | WEIGHT: 104.8 LBS | SYSTOLIC BLOOD PRESSURE: 105 MMHG | OXYGEN SATURATION: 97 % | HEIGHT: 66 IN

## 2021-05-12 VITALS
DIASTOLIC BLOOD PRESSURE: 54 MMHG | RESPIRATION RATE: 14 BRPM | OXYGEN SATURATION: 99 % | SYSTOLIC BLOOD PRESSURE: 88 MMHG

## 2021-05-12 DIAGNOSIS — N20.0 KIDNEY STONES: Primary | ICD-10-CM

## 2021-05-12 PROCEDURE — C1769 GUIDE WIRE: HCPCS | Performed by: UROLOGY

## 2021-05-12 PROCEDURE — C1758 CATHETER, URETERAL: HCPCS | Performed by: UROLOGY

## 2021-05-12 PROCEDURE — 2709999900 HC NON-CHARGEABLE SUPPLY: Performed by: UROLOGY

## 2021-05-12 PROCEDURE — 2580000003 HC RX 258: Performed by: UROLOGY

## 2021-05-12 PROCEDURE — 6360000002 HC RX W HCPCS: Performed by: NURSE ANESTHETIST, CERTIFIED REGISTERED

## 2021-05-12 PROCEDURE — 3700000000 HC ANESTHESIA ATTENDED CARE: Performed by: UROLOGY

## 2021-05-12 PROCEDURE — 2720000010 HC SURG SUPPLY STERILE: Performed by: UROLOGY

## 2021-05-12 PROCEDURE — 6360000002 HC RX W HCPCS: Performed by: UROLOGY

## 2021-05-12 PROCEDURE — 3700000001 HC ADD 15 MINUTES (ANESTHESIA): Performed by: UROLOGY

## 2021-05-12 PROCEDURE — 3600000013 HC SURGERY LEVEL 3 ADDTL 15MIN: Performed by: UROLOGY

## 2021-05-12 PROCEDURE — 7100000001 HC PACU RECOVERY - ADDTL 15 MIN: Performed by: UROLOGY

## 2021-05-12 PROCEDURE — 7100000011 HC PHASE II RECOVERY - ADDTL 15 MIN: Performed by: UROLOGY

## 2021-05-12 PROCEDURE — 7100000010 HC PHASE II RECOVERY - FIRST 15 MIN: Performed by: UROLOGY

## 2021-05-12 PROCEDURE — C2628 CATHETER, OCCLUSION: HCPCS | Performed by: UROLOGY

## 2021-05-12 PROCEDURE — 3600000003 HC SURGERY LEVEL 3 BASE: Performed by: UROLOGY

## 2021-05-12 PROCEDURE — 6360000002 HC RX W HCPCS: Performed by: ANESTHESIOLOGY

## 2021-05-12 PROCEDURE — 6370000000 HC RX 637 (ALT 250 FOR IP)

## 2021-05-12 PROCEDURE — 7100000000 HC PACU RECOVERY - FIRST 15 MIN: Performed by: UROLOGY

## 2021-05-12 RX ORDER — LABETALOL 20 MG/4 ML (5 MG/ML) INTRAVENOUS SYRINGE
5 EVERY 10 MIN PRN
Status: DISCONTINUED | OUTPATIENT
Start: 2021-05-12 | End: 2021-05-12 | Stop reason: HOSPADM

## 2021-05-12 RX ORDER — PROPOFOL 10 MG/ML
INJECTION, EMULSION INTRAVENOUS PRN
Status: DISCONTINUED | OUTPATIENT
Start: 2021-05-12 | End: 2021-05-12 | Stop reason: SDUPTHER

## 2021-05-12 RX ORDER — SCOLOPAMINE TRANSDERMAL SYSTEM 1 MG/1
1 PATCH, EXTENDED RELEASE TRANSDERMAL
Status: DISCONTINUED | OUTPATIENT
Start: 2021-05-12 | End: 2021-05-12 | Stop reason: HOSPADM

## 2021-05-12 RX ORDER — FENTANYL CITRATE 50 UG/ML
INJECTION, SOLUTION INTRAMUSCULAR; INTRAVENOUS
Status: DISCONTINUED
Start: 2021-05-12 | End: 2021-05-12 | Stop reason: HOSPADM

## 2021-05-12 RX ORDER — PROMETHAZINE HYDROCHLORIDE 25 MG/ML
12.5 INJECTION, SOLUTION INTRAMUSCULAR; INTRAVENOUS
Status: DISCONTINUED | OUTPATIENT
Start: 2021-05-12 | End: 2021-05-12 | Stop reason: HOSPADM

## 2021-05-12 RX ORDER — FENTANYL CITRATE 50 UG/ML
25 INJECTION, SOLUTION INTRAMUSCULAR; INTRAVENOUS EVERY 5 MIN PRN
Status: DISCONTINUED | OUTPATIENT
Start: 2021-05-12 | End: 2021-05-12 | Stop reason: HOSPADM

## 2021-05-12 RX ORDER — DEXAMETHASONE SODIUM PHOSPHATE 10 MG/ML
INJECTION, EMULSION INTRAMUSCULAR; INTRAVENOUS PRN
Status: DISCONTINUED | OUTPATIENT
Start: 2021-05-12 | End: 2021-05-12 | Stop reason: SDUPTHER

## 2021-05-12 RX ORDER — HYDROCODONE BITARTRATE AND ACETAMINOPHEN 5; 325 MG/1; MG/1
1 TABLET ORAL ONCE
Status: COMPLETED | OUTPATIENT
Start: 2021-05-12 | End: 2021-05-12

## 2021-05-12 RX ORDER — MEPERIDINE HYDROCHLORIDE 25 MG/ML
12.5 INJECTION INTRAMUSCULAR; INTRAVENOUS; SUBCUTANEOUS EVERY 5 MIN PRN
Status: DISCONTINUED | OUTPATIENT
Start: 2021-05-12 | End: 2021-05-12 | Stop reason: HOSPADM

## 2021-05-12 RX ORDER — MIDAZOLAM HYDROCHLORIDE 1 MG/ML
INJECTION INTRAMUSCULAR; INTRAVENOUS PRN
Status: DISCONTINUED | OUTPATIENT
Start: 2021-05-12 | End: 2021-05-12 | Stop reason: SDUPTHER

## 2021-05-12 RX ORDER — CEPHALEXIN 500 MG/1
500 CAPSULE ORAL 3 TIMES DAILY
Qty: 9 CAPSULE | Refills: 0 | Status: SHIPPED | OUTPATIENT
Start: 2021-05-12 | End: 2021-05-15

## 2021-05-12 RX ORDER — FENTANYL CITRATE 50 UG/ML
INJECTION, SOLUTION INTRAMUSCULAR; INTRAVENOUS PRN
Status: DISCONTINUED | OUTPATIENT
Start: 2021-05-12 | End: 2021-05-12 | Stop reason: SDUPTHER

## 2021-05-12 RX ORDER — HYDROCODONE BITARTRATE AND ACETAMINOPHEN 5; 325 MG/1; MG/1
1 TABLET ORAL EVERY 6 HOURS PRN
Qty: 15 TABLET | Refills: 0 | Status: SHIPPED | OUTPATIENT
Start: 2021-05-12 | End: 2021-05-17

## 2021-05-12 RX ORDER — SODIUM CHLORIDE 9 MG/ML
INJECTION, SOLUTION INTRAVENOUS CONTINUOUS
Status: DISCONTINUED | OUTPATIENT
Start: 2021-05-12 | End: 2021-05-12 | Stop reason: HOSPADM

## 2021-05-12 RX ORDER — FENTANYL CITRATE 50 UG/ML
50 INJECTION, SOLUTION INTRAMUSCULAR; INTRAVENOUS EVERY 5 MIN PRN
Status: DISCONTINUED | OUTPATIENT
Start: 2021-05-12 | End: 2021-05-12 | Stop reason: HOSPADM

## 2021-05-12 RX ORDER — SCOLOPAMINE TRANSDERMAL SYSTEM 1 MG/1
PATCH, EXTENDED RELEASE TRANSDERMAL
Status: DISCONTINUED
Start: 2021-05-12 | End: 2021-05-12 | Stop reason: HOSPADM

## 2021-05-12 RX ORDER — ONDANSETRON 2 MG/ML
INJECTION INTRAMUSCULAR; INTRAVENOUS PRN
Status: DISCONTINUED | OUTPATIENT
Start: 2021-05-12 | End: 2021-05-12 | Stop reason: SDUPTHER

## 2021-05-12 RX ORDER — HYDROCODONE BITARTRATE AND ACETAMINOPHEN 5; 325 MG/1; MG/1
TABLET ORAL
Status: COMPLETED
Start: 2021-05-12 | End: 2021-05-12

## 2021-05-12 RX ADMIN — ONDANSETRON HYDROCHLORIDE 4 MG: 4 INJECTION, SOLUTION INTRAMUSCULAR; INTRAVENOUS at 13:10

## 2021-05-12 RX ADMIN — CEFAZOLIN SODIUM 2000 MG: 10 INJECTION, POWDER, FOR SOLUTION INTRAVENOUS at 13:08

## 2021-05-12 RX ADMIN — HYDROCODONE BITARTRATE AND ACETAMINOPHEN 1 TABLET: 5; 325 TABLET ORAL at 15:22

## 2021-05-12 RX ADMIN — FENTANYL CITRATE 100 MCG: 50 INJECTION, SOLUTION INTRAMUSCULAR; INTRAVENOUS at 13:05

## 2021-05-12 RX ADMIN — PROPOFOL 150 MG: 10 INJECTION, EMULSION INTRAVENOUS at 13:05

## 2021-05-12 RX ADMIN — FENTANYL CITRATE 50 MCG: 50 INJECTION, SOLUTION INTRAMUSCULAR; INTRAVENOUS at 13:58

## 2021-05-12 RX ADMIN — MIDAZOLAM 2 MG: 1 INJECTION INTRAMUSCULAR; INTRAVENOUS at 13:02

## 2021-05-12 RX ADMIN — FENTANYL CITRATE 50 MCG: 50 INJECTION, SOLUTION INTRAMUSCULAR; INTRAVENOUS at 14:03

## 2021-05-12 RX ADMIN — DEXAMETHASONE SODIUM PHOSPHATE 10 MG: 10 INJECTION, EMULSION INTRAMUSCULAR; INTRAVENOUS at 13:10

## 2021-05-12 RX ADMIN — SODIUM CHLORIDE: 9 INJECTION, SOLUTION INTRAVENOUS at 12:00

## 2021-05-12 ASSESSMENT — PULMONARY FUNCTION TESTS
PIF_VALUE: 22
PIF_VALUE: 23
PIF_VALUE: 2
PIF_VALUE: 17
PIF_VALUE: 18
PIF_VALUE: 3
PIF_VALUE: 9
PIF_VALUE: 13
PIF_VALUE: 3
PIF_VALUE: 32
PIF_VALUE: 9
PIF_VALUE: 3
PIF_VALUE: 17
PIF_VALUE: 3

## 2021-05-12 ASSESSMENT — PAIN SCALES - GENERAL
PAINLEVEL_OUTOF10: 6
PAINLEVEL_OUTOF10: 5
PAINLEVEL_OUTOF10: 6

## 2021-05-12 ASSESSMENT — PAIN DESCRIPTION - LOCATION: LOCATION: ABDOMEN

## 2021-05-12 ASSESSMENT — PAIN DESCRIPTION - DESCRIPTORS
DESCRIPTORS: ACHING
DESCRIPTORS: ACHING;SHARP;SHOOTING

## 2021-05-12 ASSESSMENT — PAIN - FUNCTIONAL ASSESSMENT
PAIN_FUNCTIONAL_ASSESSMENT: 0-10
PAIN_FUNCTIONAL_ASSESSMENT: PREVENTS OR INTERFERES SOME ACTIVE ACTIVITIES AND ADLS

## 2021-05-12 ASSESSMENT — PAIN DESCRIPTION - PROGRESSION
CLINICAL_PROGRESSION: NOT CHANGED
CLINICAL_PROGRESSION: GRADUALLY IMPROVING

## 2021-05-12 NOTE — PROGRESS NOTES
Pt returned to [facility] room 18. Vitals and assessment as charted. 0.9 infusing, @ 50ml to count from PACU. Pt has crackers and water. Family at the bedside. Pt and family verbalized understanding of discharge criteria and call light use. Call light in reach.

## 2021-05-12 NOTE — PROGRESS NOTES
Oriented to sds       18  Refuses flu and pneumonia vaccine. Family updated to stay in room. Informed family to take belonging with them if they leave. Keep nothing of value in room unattended. pt was asked and agreed to first name and last initial being put on white boards. Allergy and fall risks applied. SCD Applied to patient. Warming blanket applied to patient. Pt denies any abuse or thoughts of suicide.

## 2021-05-12 NOTE — ANESTHESIA POSTPROCEDURE EVALUATION
Department of Anesthesiology  Postprocedure Note    Patient: Larissa Moritz  MRN: [id number]  YOB: 1985  Date of evaluation: 5/12/2021  Time:  2:54 PM     Procedure Summary     Date: 05/12/21 Room / Location: [facility] / [facility]    Anesthesia Start: 1302 Anesthesia Stop: 1872    Procedure: CYSTOSCOPY, RIGHT URETEROSCOPY (Right ) Diagnosis: (KIDNEY STONES)    Surgeons: Beronica Paris MD Responsible Provider: Negro Marie DO    Anesthesia Type: general ASA Status: 2          Anesthesia Type: general    Nathanael Phase I: Nathanael Score: 10    Nathanael Phase II: Nathanael Score: 10    Last vitals: Reviewed and per EMR flowsheets.        Anesthesia Post Evaluation    Patient location during evaluation: PACU  Patient participation: complete - patient participated  Level of consciousness: awake  Airway patency: patent  Nausea & Vomiting: no nausea and no vomiting  Complications: no  Cardiovascular status: hemodynamically stable  Respiratory status: acceptable  Hydration status: stable

## 2021-05-12 NOTE — PROGRESS NOTES

## 2021-05-12 NOTE — OP NOTE
a stent before. She preferred to not have one placed at this time. Because this, and since the ureter was widely patent, I decided not to place a stent. Her bladder was drained. All instruments were removed. The patient was then awakened, extubated, and discharged back to the PACU in good and stable condition. Follow-Up: We would like to see her back in our office in 3 months with one of the nurse practitioners with a renal ultrasound.     Milka Bess  Electronically signed on 5/12/2021 at 1:20 PM

## 2021-05-12 NOTE — ANESTHESIA PRE PROCEDURE
Department of Anesthesiology  Preprocedure Note       Name:  Manish Pryor   Age:  43 y.o.  :  1985                                          MRN:  [id number]         Date:  2021      Surgeon: Guanaco Lopez):  Capo Novoa MD    Procedure: Procedure(s):  CYSTOSCOPY, RIGHT URETEROSCOPY,  LASER LITHOTRIPSY, BASKET RETRIEVAL OF STONE FRAGMENTS AND POSSIBLE RIGHT URETERAL STENT PLACEMENT    Medications prior to admission:   Prior to Admission medications    Medication Sig Start Date End Date Taking? Authorizing Provider   Multiple Vitamins-Minerals (WOMENS ONE DAILY) TABS Take by mouth daily   Yes Historical Provider, MD   vitamin D3 (CHOLECALCIFEROL) 25 MCG (1000 UT) TABS tablet Take 2 tablets by mouth daily 3/10/21 6/8/21 Yes SILVIO York CNP   albuterol sulfate HFA (VENTOLIN HFA) 108 (90 Base) MCG/ACT inhaler Inhale 2 puffs into the lungs every 4 hours as needed for Wheezing 3/8/21  Yes SILVIO York CNP   levothyroxine (SYNTHROID) 100 MCG tablet Take 100 mcg by mouth Daily   Yes Historical Provider, MD   ibuprofen (ADVIL;MOTRIN) 800 MG tablet Take 800 mg by mouth every 6 hours as needed for Pain   Yes Historical Provider, MD       Current medications:    Current Facility-Administered Medications   Medication Dose Route Frequency Provider Last Rate Last Admin    0.9 % sodium chloride infusion   Intravenous Continuous Capo Novoa  mL/hr at 21 1200 New Bag at 21 1200    ceFAZolin (ANCEF) 2000 mg in dextrose 5 % 50 mL IVPB  2,000 mg Intravenous 30 Min Pre-Op Capo Novoa MD        scopolamine (TRANSDERM-SCOP) transdermal patch 1 patch  1 patch Transdermal Q72H Govind Burden DO   1 patch at 21 1203       Allergies:     Allergies   Allergen Reactions    Adhesive Tape Hives    Morphine        Problem List:    Patient Active Problem List   Diagnosis Code    Malignant tumor of thyroid gland ([address] C73    Kidney stones N20.0    Chronic pain G89.29    Value Date     03/08/2021    K 4.4 03/08/2021     03/08/2021    CO2 27 03/08/2021    BUN 9 03/08/2021    CREATININE 0.6 03/08/2021    LABGLOM >90 03/08/2021    GLUCOSE 82 03/08/2021    CALCIUM 9.0 03/08/2021    BILITOT 0.3 10/21/2020    ALKPHOS 53 10/21/2020    AST 11 10/21/2020    ALT 8 10/21/2020       POC Tests: No results for input(s): POCGLU, POCNA, POCK, POCCL, POCBUN, POCHEMO, POCHCT in the last 72 hours. Coags: No results found for: PROTIME, INR, APTT    HCG (If Applicable): No results found for: PREGTESTUR, PREGSERUM, HCG, HCGQUANT     ABGs: No results found for: PHART, PO2ART, OIJ7FGL, JGL6IGV, BEART, F8OSBZQQ     Type & Screen (If Applicable):  No results found for: LABABO, LABRH    Drug/Infectious Status (If Applicable):  Lab Results   Component Value Date    HEPCAB Negative 03/08/2021       COVID-19 Screening (If Applicable):   Lab Results   Component Value Date    COVID19 NOT DETECTED 05/05/2021           Anesthesia Evaluation  Patient summary reviewed and Nursing notes reviewed   history of anesthetic complications: PONV. Airway: Mallampati: I        Dental:          Pulmonary: breath sounds clear to auscultation  (+) asthma:                            Cardiovascular:  Exercise tolerance: good (>4 METS),           Rhythm: regular  Rate: normal                    Neuro/Psych:               GI/Hepatic/Renal:             Endo/Other:    (+) malignancy/cancer. Abdominal:       Abdomen: soft. Vascular:                                        Anesthesia Plan      general     ASA 2       Induction: intravenous. MIPS: Postoperative opioids intended and Prophylactic antiemetics administered. Anesthetic plan and risks discussed with patient. Plan discussed with CRNA.                   Ava Sanchez DO   5/12/2021

## 2021-05-12 NOTE — H&P
Dispense Refill    Multiple Vitamins-Minerals (WOMENS ONE DAILY) TABS Take by mouth daily      vitamin D3 (CHOLECALCIFEROL) 25 MCG (1000 UT) TABS tablet Take 2 tablets by mouth daily 180 tablet 0    albuterol sulfate HFA (VENTOLIN HFA) 108 (90 Base) MCG/ACT inhaler Inhale 2 puffs into the lungs every 4 hours as needed for Wheezing 1 Inhaler 1    levothyroxine (SYNTHROID) 100 MCG tablet Take 100 mcg by mouth Daily      ibuprofen (ADVIL;MOTRIN) 800 MG tablet Take 800 mg by mouth every 6 hours as needed for Pain         Adhesive tape and Morphine  Social History     Tobacco Use   Smoking Status Current Every Day Smoker    Packs/day: 0.50    Years: 23.00    Pack years: 11.50    Types: Cigarettes   Smokeless Tobacco Never Used       Social History     Substance and Sexual Activity   Alcohol Use Yes    Comment: rarely       REVIEW OF SYSTEMS:  Constitutional: negative  Eyes: negative  Respiratory: negative  Cardiovascular: negative  Gastrointestinal: negative  Genitourinary: negative except for what is in HPI  Musculoskeletal: negative  Skin: negative   Neurological: negative  Hematological/Lymphatic: negative  Psychological: negative    Physical Exam:    This a 43 y.o. female      Vitals:    05/12/21 1119   BP: (!) 104/59   Pulse: 72   Resp:    Temp:    SpO2:      Constitutional: Patient in no acute distress. Neuro: Alert and oriented to person, place and time. Psych: mood and affect normal  HEENT negative  Lungs: Respiratory effort is normal  Cardiovascular: Normal peripheral pulses  Abdomen: Soft, non-tender, non-distended with no CVA, flank pain or hepatosplenomegaly. No hernias. Kidneys normal.  Lymphatics: No palpable lymphadenopathy. Bladder non-tender and not distended. Assessment and Plan         Right ureteral stone. Right flank and lower abdominal pain. Plan:      No follow-ups on file. Cystoscopy, right ureteroscopy with laser lithotripsy, and right ureteral stent placement. Dr. Mirlande Henderson MD

## 2021-05-13 ENCOUNTER — TELEPHONE (OUTPATIENT)
Dept: UROLOGY | Age: 36
End: 2021-05-13

## 2021-05-13 DIAGNOSIS — N20.0 KIDNEY STONES: Primary | ICD-10-CM

## 2021-07-28 ENCOUNTER — OFFICE VISIT (OUTPATIENT)
Dept: FAMILY MEDICINE CLINIC | Age: 36
End: 2021-07-28
Payer: COMMERCIAL

## 2021-07-28 VITALS
HEART RATE: 76 BPM | BODY MASS INDEX: 17.8 KG/M2 | WEIGHT: 108.6 LBS | DIASTOLIC BLOOD PRESSURE: 70 MMHG | SYSTOLIC BLOOD PRESSURE: 108 MMHG | RESPIRATION RATE: 14 BRPM | OXYGEN SATURATION: 99 %

## 2021-07-28 DIAGNOSIS — R30.0 DYSURIA: Primary | ICD-10-CM

## 2021-07-28 LAB
BILIRUBIN URINE: NEGATIVE
BLOOD URINE, POC: ABNORMAL
CHARACTER, URINE: CLEAR
COLOR, URINE: ABNORMAL
GLUCOSE URINE: NEGATIVE MG/DL
KETONES, URINE: NEGATIVE
LEUKOCYTE CLUMPS, URINE: NEGATIVE
NITRITE, URINE: POSITIVE
PH, URINE: 6.5 (ref 5–9)
PROTEIN, URINE: NEGATIVE MG/DL
SPECIFIC GRAVITY, URINE: 1.02 (ref 1–1.03)
UROBILINOGEN, URINE: 0.2 EU/DL (ref 0–1)

## 2021-07-28 PROCEDURE — 99213 OFFICE O/P EST LOW 20 MIN: CPT | Performed by: NURSE PRACTITIONER

## 2021-07-28 RX ORDER — CIPROFLOXACIN 500 MG/1
500 TABLET, FILM COATED ORAL 2 TIMES DAILY
Qty: 10 TABLET | Refills: 0 | Status: SHIPPED | OUTPATIENT
Start: 2021-07-28 | End: 2021-08-02

## 2021-07-28 ASSESSMENT — ENCOUNTER SYMPTOMS
WHEEZING: 0
EYE ITCHING: 0
COUGH: 0
EYE DISCHARGE: 0
ABDOMINAL DISTENTION: 0
VOMITING: 0
VOICE CHANGE: 0
SINUS PRESSURE: 0
EYE PAIN: 0
ABDOMINAL PAIN: 0
NAUSEA: 0
CONSTIPATION: 0
BACK PAIN: 0
CHOKING: 0
SHORTNESS OF BREATH: 0
COLOR CHANGE: 0
CHEST TIGHTNESS: 0

## 2021-07-28 NOTE — PROGRESS NOTES
[address] [address]  Dept: [phone]  Dept Fax: [phone]  Loc: [phone]      Jeremias Hamilton is a 43 y.o. female who presents todayfor Urinary Frequency (x1 days), Hematuria, and Dysuria      HPI:      Urinary pain and frequency since yesterday. No back or nausea/vomiting. The patient is allergic to adhesive tape and morphine. Past MedicalHistory  Nadia Hatfield  has a past medical history of Asthma, Cancer ([address] Hyperlipidemia, Kidney stones, PONV (postoperative nausea and vomiting), and Thyroid disease. Medications    Current Outpatient Medications:     ciprofloxacin (CIPRO) 500 MG tablet, Take 1 tablet by mouth 2 times daily for 5 days, Disp: 10 tablet, Rfl: 0    albuterol sulfate HFA (VENTOLIN HFA) 108 (90 Base) MCG/ACT inhaler, Inhale 2 puffs into the lungs every 4 hours as needed for Wheezing, Disp: 1 Inhaler, Rfl: 1    levothyroxine (SYNTHROID) 100 MCG tablet, Take 100 mcg by mouth Daily, Disp: , Rfl:     ibuprofen (ADVIL;MOTRIN) 800 MG tablet, Take 800 mg by mouth every 6 hours as needed for Pain, Disp: , Rfl:     Multiple Vitamins-Minerals (WOMENS ONE DAILY) TABS, Take by mouth daily (Patient not taking: Reported on 7/28/2021), Disp: , Rfl:     vitamin D3 (CHOLECALCIFEROL) 25 MCG (1000 UT) TABS tablet, Take 2 tablets by mouth daily (Patient not taking: Reported on 7/28/2021), Disp: 180 tablet, Rfl: 0    Subjective:      Review of Systems   Constitutional: Negative for appetite change, chills, fatigue and fever. HENT: Negative for congestion, ear discharge, sinus pressure, tinnitus and voice change. Eyes: Negative for pain, discharge, itching and visual disturbance. Respiratory: Negative for cough, choking, chest tightness, shortness of breath and wheezing. Cardiovascular: Negative for chest pain, palpitations and leg swelling.    Gastrointestinal: Negative for abdominal distention, abdominal pain, constipation, nausea and vomiting. Endocrine: Negative for cold intolerance and heat intolerance. Genitourinary: Positive for dysuria and frequency. Negative for hematuria, vaginal discharge and vaginal pain. Musculoskeletal: Negative for arthralgias, back pain, gait problem, neck pain and neck stiffness. Skin: Negative for color change and rash. Neurological: Negative for dizziness, syncope, speech difficulty, light-headedness, numbness and headaches. Psychiatric/Behavioral: Negative for behavioral problems, confusion, self-injury and suicidal ideas. The patient is not nervous/anxious. Objective:        Vitals:    07/28/21 0948   BP: 108/70   Site: Left Upper Arm   Pulse: 76   Resp: 14   SpO2: 99%   Weight: 108 lb 9.6 oz (49.3 kg)      Physical Exam  Vitals reviewed. Constitutional:       Appearance: Normal appearance. HENT:      Head: Normocephalic and atraumatic. Pulmonary:      Effort: Pulmonary effort is normal.   Abdominal:      General: Abdomen is flat. Bowel sounds are normal. There is no distension. Palpations: There is no mass. Tenderness: There is no abdominal tenderness. There is no right CVA tenderness or left CVA tenderness. Neurological:      Mental Status: She is alert. Assessment/Plan:       Jodelle Harada was seen today for urinary frequency, hematuria and dysuria. Diagnoses and all orders for this visit:    Dysuria  -     Culture, Urine  -     ciprofloxacin (CIPRO) 500 MG tablet; Take 1 tablet by mouth 2 times daily for 5 days    Other orders  -     POCT Urinalysis No Micro (Auto)      Non toxic appearing and in no acute distress. Exam consistent with acute uti   ua in office showed positive for Nit,  Sending for culture. Started Cipro. Increase fluids and rest.   Return in about 1 week (around 8/4/2021), or if symptoms worsen or fail to improve. Patient instructions given and reviewed.     Electronicallysigned by Avelino Valladares Bernabe Rolon, APRN - CNP on 7/28/2021 at 10:25 AM

## 2021-07-30 LAB
ORGANISM: ABNORMAL
URINE CULTURE, ROUTINE: ABNORMAL

## 2021-08-09 ENCOUNTER — HOSPITAL ENCOUNTER (OUTPATIENT)
Dept: ULTRASOUND IMAGING | Age: 36
Discharge: HOME | End: 2021-08-09
Payer: COMMERCIAL

## 2021-08-09 DIAGNOSIS — N20.0 KIDNEY STONES: ICD-10-CM

## 2021-08-09 PROCEDURE — 76770 US EXAM ABDO BACK WALL COMP: CPT

## 2021-08-23 ENCOUNTER — OFFICE VISIT (OUTPATIENT)
Dept: UROLOGY | Age: 36
End: 2021-08-23
Payer: COMMERCIAL

## 2021-08-23 VITALS — RESPIRATION RATE: 18 BRPM | BODY MASS INDEX: 17.19 KG/M2 | HEIGHT: 66 IN | WEIGHT: 107 LBS

## 2021-08-23 DIAGNOSIS — R31.29 MICROHEMATURIA: ICD-10-CM

## 2021-08-23 DIAGNOSIS — R35.0 URINARY FREQUENCY: ICD-10-CM

## 2021-08-23 DIAGNOSIS — N20.0 KIDNEY STONES: Primary | ICD-10-CM

## 2021-08-23 LAB
BACTERIA: NORMAL
BILIRUBIN URINE: NEGATIVE
BILIRUBIN URINE: NEGATIVE
BLOOD URINE, POC: NORMAL
BLOOD, URINE: NEGATIVE
CASTS: NORMAL /LPF
CASTS: NORMAL /LPF
CHARACTER, URINE: CLEAR
CHARACTER, URINE: CLEAR
COLOR, URINE: YELLOW
COLOR: YELLOW
CRYSTALS: NORMAL
EPITHELIAL CELLS, UA: NORMAL /HPF
GLUCOSE URINE: NEGATIVE MG/DL
GLUCOSE, URINE: NEGATIVE MG/DL
KETONES, URINE: NEGATIVE
KETONES, URINE: NEGATIVE
LEUKOCYTE CLUMPS, URINE: NEGATIVE
LEUKOCYTE ESTERASE, URINE: NEGATIVE
MISCELLANEOUS LAB TEST RESULT: NORMAL
NITRITE, URINE: NEGATIVE
NITRITE, URINE: NEGATIVE
PH UA: 7.5 (ref 5–9)
PH, URINE: 7 (ref 5–9)
POST VOID RESIDUAL (PVR): 0 ML
PROTEIN UA: NEGATIVE MG/DL
PROTEIN, URINE: NEGATIVE MG/DL
RBC URINE: NORMAL /HPF
RENAL EPITHELIAL, UA: NORMAL
SPECIFIC GRAVITY UA: 1.02 (ref 1–1.03)
SPECIFIC GRAVITY, URINE: 1.02 (ref 1–1.03)
UROBILINOGEN, URINE: 0.2 EU/DL (ref 0–1)
UROBILINOGEN, URINE: 0.2 EU/DL (ref 0–1)
WBC UA: NORMAL /HPF
YEAST: NORMAL

## 2021-08-23 PROCEDURE — 81003 URINALYSIS AUTO W/O SCOPE: CPT | Performed by: NURSE PRACTITIONER

## 2021-08-23 PROCEDURE — 99213 OFFICE O/P EST LOW 20 MIN: CPT | Performed by: NURSE PRACTITIONER

## 2021-08-23 PROCEDURE — 51798 US URINE CAPACITY MEASURE: CPT | Performed by: NURSE PRACTITIONER

## 2021-08-23 NOTE — PROGRESS NOTES
Cancer in her father and maternal grandfather; High Blood Pressure in her mother. Social History  Clarice Elizalde  reports that she has been smoking cigarettes. She has a 11.50 pack-year smoking history. She has never used smokeless tobacco. She reports current alcohol use. She reports that she does not use drugs. Subjective:     Review of Systems  No problems with ears, nose or throat. No problems with eyes. No chest pain, shortness of breath, abdominal pain, extremity pain or weakness, and no neurological deficits. No rashes.  symptoms per HPI. The remainder of the review of symptoms is negative. Objective:     PE:   Vitals:    08/23/21 1044   Resp: 18   Weight: 107 lb (48.5 kg)   Height: 5' 5.5\" (1.664 m)       Constitutional: Alert and oriented times 3, no acute distress and cooperative to examination with appropriate mood and affect. HENT:   Head:        Normocephalic and atraumatic. Mouth/Throat:         Mucous membranes are normal.   Eyes:         EOM are normal. No scleral icterus. PERRLA. Neck:        Supple, symmetrical, trachea midline  Cardiovascular:        Normal rate, regular rhythm, S1 S2 heart sounds. No murmurs, rub, or gallops. Pulmonary/Chest:    . Normal respiratory rate and rhthym. No use of accessory muscles. Abdominal:         Soft. No tenderness. Bowel sounds present. Musculoskeletal:         Normal range of motion. No edema or tenderness of lower extremities. Extremities: No cyanosis, clubbing, or edema present. Neurological:        Alert and oriented. No cranial nerve deficit. Harman Svitlana Psychiatric:        Normal mood and affect.       Labs   Urine dip demonstrates   Results for POC orders placed in visit on 08/23/21   POCT Urinalysis No Micro (Auto)   Result Value Ref Range    Glucose, Ur Negative NEGATIVE mg/dl    Bilirubin Urine Negative     Ketones, Urine Negative NEGATIVE    Specific Gravity, Urine 1.025 1.002 - 1.030    Blood, UA POC Trace-lysed NEGATIVE    pH, Urine 7.00 5.0 - 9.0    Protein, Urine Negative NEGATIVE mg/dl    Urobilinogen, Urine 0.20 0.0 - 1.0 eu/dl    Nitrite, Urine Negative NEGATIVE    Leukocyte Clumps, Urine Negative NEGATIVE    Color, Urine Yellow YELLOW-STRAW    Character, Urine Clear CLR-SL.CLOUD   poct post void residual   Result Value Ref Range    post void residual 0 ml        Recent BUN/Creatinine:  Lab Results   Component Value Date    BUN 9 03/08/2021    CREATININE 0.6 03/08/2021       Radiology  The patient has had a Renal Ultrasound which I have reviewed along with its accompanying report. The study demonstrates :                FINDINGS: The right kidney measures 11.7 x 4.2 x 4.4 cm and the left kidney measures 10.7 x 4.8 x 5.4 cm. Renal cortical thickness and echogenicity are normal bilaterally. There is no hydronephrosis, calculi or intrarenal mass on either side.       Color Doppler demonstrates expected wave forms in the bilateral renal arteries. Arcuate resistive indices are at the upper limits of normal bilaterally.       The distended urinary bladder is unremarkable. There is a small amount postvoid residual urine in the bladder.           Impression   1. Normal bilateral renal ultrasound. 2. Small amount postvoid residual urine but otherwise unremarkable urinary bladder. Assessment & Plan:     Pelon Sanchez was seen today for post-op check. Diagnoses and all orders for this visit:    Kidney stones  -     POCT Urinalysis No Micro (Auto)  -     XR ABDOMEN (KUB) (SINGLE AP VIEW); Future  -     poct post void residual    Microhematuria  -     Urinalysis With Microscopic  -     poct post void residual    Urinary frequency  -     poct post void residual      PVR 0 today  Send urine for micro  Call if pain worsens. Fu in 6 months with KUB prior  Discussed litholink. Pt declines at this time. No follow-ups on file.     Heaven Putnam, APRN - CNP, APRN  Urology

## 2022-02-28 ENCOUNTER — TELEPHONE (OUTPATIENT)
Dept: UROLOGY | Age: 37
End: 2022-02-28

## 2022-02-28 DIAGNOSIS — N20.0 KIDNEY STONES: Primary | ICD-10-CM

## 2022-02-28 NOTE — TELEPHONE ENCOUNTER
Patient calling in regarding appt that was scheduled for today 2/28/2022 with Martha Acosta. She received a letter that someone would call her to reschedule but she has not received a call yet. Please verify which provider she needs to follow up and call her to reschedule.

## 2022-03-04 LAB
ALBUMIN SERPL-MCNC: 4.4 G/DL
ALP BLD-CCNC: 49 U/L
ALT SERPL-CCNC: 12 U/L
ANION GAP SERPL CALCULATED.3IONS-SCNC: 10 MMOL/L
AST SERPL-CCNC: 15 U/L
BASOPHILS ABSOLUTE: 0 /ΜL
BASOPHILS RELATIVE PERCENT: 0.1 %
BILIRUB SERPL-MCNC: 0.6 MG/DL (ref 0.1–1.4)
BILIRUBIN URINE: ABNORMAL
BLOOD, URINE: ABNORMAL
BUN BLDV-MCNC: 8 MG/DL
CALCIUM SERPL-MCNC: 9.2 MG/DL
CHLORIDE BLD-SCNC: 106 MMOL/L
CLARITY: ABNORMAL
CO2: 26 MMOL/L
COLOR: YELLOW
CREAT SERPL-MCNC: 0.6 MG/DL
EOSINOPHILS ABSOLUTE: 0 /ΜL
EOSINOPHILS RELATIVE PERCENT: 0.7 %
GFR CALCULATED: NORMAL
GLUCOSE BLD-MCNC: 63 MG/DL
GLUCOSE URINE: NEGATIVE
HCT VFR BLD CALC: 39.1 % (ref 36–46)
HEMOGLOBIN: 13.6 G/DL (ref 12–16)
KETONES, URINE: NEGATIVE
LEUKOCYTE ESTERASE, URINE: ABNORMAL
LYMPHOCYTES ABSOLUTE: 1.5 /ΜL
LYMPHOCYTES RELATIVE PERCENT: 20.8 %
MCH RBC QN AUTO: 31.8 PG
MCHC RBC AUTO-ENTMCNC: 34.7 G/DL
MCV RBC AUTO: 91.8 FL
MONOCYTES ABSOLUTE: 0.6 /ΜL
MONOCYTES RELATIVE PERCENT: 8.5 %
NEUTROPHILS ABSOLUTE: 4.9 /ΜL
NEUTROPHILS RELATIVE PERCENT: 69.9 %
NITRITE, URINE: NEGATIVE
PDW BLD-RTO: 13.5 %
PH UA: 6.5 (ref 4.5–8)
PLATELET # BLD: 162 K/ΜL
PMV BLD AUTO: 9 FL
POTASSIUM SERPL-SCNC: 3.9 MMOL/L
PROTEIN UA: NEGATIVE
RBC # BLD: 4.26 10^6/ΜL
SODIUM BLD-SCNC: 138 MMOL/L
SPECIFIC GRAVITY UA: ABNORMAL
TOTAL PROTEIN: 6.9
UROBILINOGEN, URINE: ABNORMAL
WBC # BLD: 7.1 10^3/ML

## 2022-03-28 ENCOUNTER — HOSPITAL ENCOUNTER (OUTPATIENT)
Age: 37
Discharge: HOME | End: 2022-03-28
Payer: COMMERCIAL

## 2022-03-28 ENCOUNTER — HOSPITAL ENCOUNTER (OUTPATIENT)
Dept: GENERAL RADIOLOGY | Age: 37
Discharge: HOME | End: 2022-03-28
Payer: COMMERCIAL

## 2022-03-28 DIAGNOSIS — N20.0 KIDNEY STONES: ICD-10-CM

## 2022-03-28 PROCEDURE — 74018 RADEX ABDOMEN 1 VIEW: CPT

## 2022-03-29 ENCOUNTER — OFFICE VISIT (OUTPATIENT)
Dept: UROLOGY | Age: 37
End: 2022-03-29
Payer: COMMERCIAL

## 2022-03-29 ENCOUNTER — TELEPHONE (OUTPATIENT)
Dept: UROLOGY | Age: 37
End: 2022-03-29

## 2022-03-29 VITALS
DIASTOLIC BLOOD PRESSURE: 70 MMHG | HEIGHT: 66 IN | SYSTOLIC BLOOD PRESSURE: 122 MMHG | BODY MASS INDEX: 18.48 KG/M2 | WEIGHT: 115 LBS

## 2022-03-29 DIAGNOSIS — R31.29 MICROSCOPIC HEMATURIA: ICD-10-CM

## 2022-03-29 DIAGNOSIS — N20.0 KIDNEY STONES: Primary | ICD-10-CM

## 2022-03-29 LAB
BACTERIA: ABNORMAL
BILIRUBIN URINE: NEGATIVE
BILIRUBIN URINE: NEGATIVE
BLOOD URINE, POC: ABNORMAL
BLOOD, URINE: ABNORMAL
CASTS: ABNORMAL /LPF
CASTS: ABNORMAL /LPF
CHARACTER, URINE: ABNORMAL
CHARACTER, URINE: CLEAR
COLOR, URINE: YELLOW
COLOR: YELLOW
CRYSTALS: ABNORMAL
EPITHELIAL CELLS, UA: ABNORMAL /HPF
GLUCOSE URINE: NEGATIVE MG/DL
GLUCOSE, URINE: NEGATIVE MG/DL
KETONES, URINE: NEGATIVE
KETONES, URINE: NEGATIVE
LEUKOCYTE CLUMPS, URINE: NEGATIVE
LEUKOCYTE ESTERASE, URINE: NEGATIVE
MISCELLANEOUS LAB TEST RESULT: ABNORMAL
NITRITE, URINE: NEGATIVE
NITRITE, URINE: NEGATIVE
PH UA: 5.5 (ref 5–9)
PH, URINE: 5.5 (ref 5–9)
PROTEIN UA: NEGATIVE MG/DL
PROTEIN, URINE: NEGATIVE MG/DL
RBC URINE: ABNORMAL /HPF
RENAL EPITHELIAL, UA: ABNORMAL
SPECIFIC GRAVITY UA: > 1.03 (ref 1–1.03)
SPECIFIC GRAVITY, URINE: >= 1.03 (ref 1–1.03)
UROBILINOGEN, URINE: 0.2 EU/DL (ref 0–1)
UROBILINOGEN, URINE: 0.2 EU/DL (ref 0–1)
WBC UA: ABNORMAL /HPF
YEAST: ABNORMAL

## 2022-03-29 PROCEDURE — 81003 URINALYSIS AUTO W/O SCOPE: CPT | Performed by: NURSE PRACTITIONER

## 2022-03-29 PROCEDURE — 99214 OFFICE O/P EST MOD 30 MIN: CPT | Performed by: NURSE PRACTITIONER

## 2022-03-29 RX ORDER — NITROFURANTOIN 25; 75 MG/1; MG/1
CAPSULE ORAL
Qty: 30 CAPSULE | Refills: 2 | Status: SHIPPED | OUTPATIENT
Start: 2022-03-29

## 2022-03-29 ASSESSMENT — ENCOUNTER SYMPTOMS
NAUSEA: 0
ABDOMINAL PAIN: 0
VOMITING: 0
BACK PAIN: 0

## 2022-03-29 NOTE — PROGRESS NOTES
[address] [facility]  [address]  [facility] [address]  Dept: [phone]  Loc: [phone]    Visit Date: 3/29/2022        HPI:     Duyen Jimenez is a 42 y.o. female who presents today for:  Chief Complaint   Patient presents with    Follow-up     kidney stones- kub prior        HPI   Pt seen in follow up for kidney stones. Brook Brooks has a hx of kidney stones with ureteroscopic treatment by Dr. Patrick Nova 5/12/21.      6 UTIs since last year. Recently presented to [facility] ER with abdominal pain and gross hematuria. Treated for UTI with Macrobid. CT 3/4/22 negative for nephrolithiasis, hydronephrosis, or ureteral calculi; noted mild diffuse wall thickening of the bladder. Has had several episodes of hematuria associated with infection. Current smoker--smoked for at least 20 years. Notes some correlation of infections to intercourse. Trying to increase water intake. Hx partial hysterectomy    Current Outpatient Medications   Medication Sig Dispense Refill    Multiple Vitamins-Minerals (WOMENS ONE DAILY) TABS Take by mouth daily       albuterol sulfate HFA (VENTOLIN HFA) 108 (90 Base) MCG/ACT inhaler Inhale 2 puffs into the lungs every 4 hours as needed for Wheezing 1 Inhaler 1    levothyroxine (SYNTHROID) 100 MCG tablet Take 100 mcg by mouth Daily       ibuprofen (ADVIL;MOTRIN) 800 MG tablet Take 800 mg by mouth every 6 hours as needed for Pain      vitamin D3 (CHOLECALCIFEROL) 25 MCG (1000 UT) TABS tablet Take 2 tablets by mouth daily (Patient not taking: Reported on 7/28/2021) 180 tablet 0     No current facility-administered medications for this visit. Past Medical History  Brook Brooks  has a past medical history of Asthma, Cancer ([facility] [address] Hyperlipidemia, Kidney stones, PONV (postoperative nausea and vomiting), and Thyroid disease.     Past Surgical History  The patient  has a past surgical history that includes Hysterectomy (2007); Lithotripsy (2007); Thyroidectomy (2014); EGD (2020); Colonoscopy; Cystoscopy (Right, 5/12/2021); and Cholecystectomy (02/2021). Family History  This patient's family history includes Cancer in her father and maternal grandfather; High Blood Pressure in her mother. Social History  Tami Sandra  reports that she has been smoking cigarettes. She has a 11.50 pack-year smoking history. She has never used smokeless tobacco. She reports current alcohol use. She reports that she does not use drugs. Subjective:      Review of Systems   Constitutional: Negative for activity change, appetite change, chills, diaphoresis, fatigue, fever and unexpected weight change. Gastrointestinal: Negative for abdominal pain, nausea and vomiting. Genitourinary: Positive for hematuria (Gross, currently resolved). Negative for decreased urine volume, difficulty urinating, dysuria, flank pain, frequency and urgency. Musculoskeletal: Negative for back pain. Objective:   /70   Ht 5' 5.5\" (1.664 m)   Wt 115 lb (52.2 kg)   BMI 18.85 kg/m²     Physical Exam  Vitals reviewed. Constitutional:       General: She is not in acute distress. Appearance: Normal appearance. She is well-developed. She is not ill-appearing or diaphoretic. HENT:      Head: Normocephalic and atraumatic. Right Ear: External ear normal.      Left Ear: External ear normal.      Nose: Nose normal.      Mouth/Throat:      Mouth: Mucous membranes are moist.   Eyes:      General: No scleral icterus. Right eye: No discharge. Left eye: No discharge. Neck:      Vascular: No JVD. Trachea: No tracheal deviation. Cardiovascular:      Rate and Rhythm: Normal rate and regular rhythm. Pulmonary:      Effort: Pulmonary effort is normal. No respiratory distress. Abdominal:      General: There is no distension. Tenderness: There is no abdominal tenderness.  There is no right CVA tenderness or left CVA tenderness. Musculoskeletal:         General: No tenderness. Normal range of motion. Neurological:      Mental Status: She is alert and oriented to person, place, and time. Mental status is at baseline. Psychiatric:         Mood and Affect: Mood normal.         Behavior: Behavior normal.         Thought Content: Thought content normal.         POC  Results for POC orders placed in visit on 03/29/22   POCT Urinalysis No Micro (Auto)   Result Value Ref Range    Glucose, Ur Negative NEGATIVE mg/dl    Bilirubin Urine Negative     Ketones, Urine Negative NEGATIVE    Specific Gravity, Urine >= 1.030 1.002 - 1.030    Blood, UA POC Small (A) NEGATIVE    pH, Urine 5.50 5.0 - 9.0    Protein, Urine Negative NEGATIVE mg/dl    Urobilinogen, Urine 0.20 0.0 - 1.0 eu/dl    Nitrite, Urine Negative NEGATIVE    Leukocyte Clumps, Urine Negative NEGATIVE    Color, Urine Yellow YELLOW-STRAW    Character, Urine Clear CLR-SL.CLOUD       Patients recent PSA values are as follows  No results found for: PSA, PSADIA     Recent BUN/Creatinine:  Lab Results   Component Value Date    BUN 9 03/08/2021    CREATININE 0.6 03/08/2021     Radiology  The patient has had a CT abd/pelvis 3/4/22 at [facility] which I have reviewed along with its accompanying report. The study demonstrates slight distension of bladder, mild diffuse bladder wall thickening. No hydronephrosis, ureterolithiasis, or nephrolithiasis. Assessment:   Recurrent UTIs  Gross hematuria  Bladder wall thickening on recent CT  Hx kidney stones  Hx of tobacco use  Plan:     Erika Daniels reports increased incidence of UTIs with at least 6 in the last year. Discussed increasing water intake, hygiene techniques to prevent infection, and voiding strategies to assure bladder fully empty. Will also start D mannose 500 mg TID and post-coital macrobid. With hx of tobacco use, gross hematuria, and recurrent UTIs discussed cystoscopy and pt would like to proceed.   She notes a possible feeling of bulging or prolapse as well and pelvic exam can be performed at time of cystoscopy.       Schedule cystoscopy and possible pelvic exam with one of the physicians

## 2022-03-31 LAB
ORGANISM: ABNORMAL
URINE CULTURE, ROUTINE: ABNORMAL

## 2022-04-05 ENCOUNTER — HOSPITAL ENCOUNTER (OUTPATIENT)
Dept: CT IMAGING | Age: 37
Discharge: HOME | End: 2022-04-05

## 2022-04-05 DIAGNOSIS — Z00.6 EXAMINATION FOR NORMAL COMPARISON FOR CLINICAL RESEARCH: ICD-10-CM

## 2022-04-29 ENCOUNTER — PROCEDURE VISIT (OUTPATIENT)
Dept: UROLOGY | Age: 37
End: 2022-04-29
Payer: COMMERCIAL

## 2022-04-29 VITALS — BODY MASS INDEX: 18.48 KG/M2 | RESPIRATION RATE: 16 BRPM | HEIGHT: 66 IN | WEIGHT: 115 LBS

## 2022-04-29 DIAGNOSIS — N20.0 KIDNEY STONES: ICD-10-CM

## 2022-04-29 DIAGNOSIS — R35.0 URINARY FREQUENCY: ICD-10-CM

## 2022-04-29 DIAGNOSIS — R31.29 MICROSCOPIC HEMATURIA: Primary | ICD-10-CM

## 2022-04-29 PROCEDURE — 52000 CYSTOURETHROSCOPY: CPT | Performed by: UROLOGY

## 2022-04-29 NOTE — PROGRESS NOTES
Cystoscopy    Operative Note    Patient:  Estuardo Ochoa  MRN: [id number]  YOB: 1985    Date: 04/29/22  Surgeon: Wang Camacho MD  Anesthesia: Ace Márquez  Indications:     Carri Medicus reports increased incidence of UTIs with at least 6 in the last year. Discussed increasing water intake, hygiene techniques to prevent infection, and voiding strategies to assure bladder fully empty. Will also start D mannose 500 mg TID and post-coital macrobid. With hx of tobacco use, gross hematuria, and recurrent UTIs discussed cystoscopy and pt would like to proceed. She notes a possible feeling of bulging or prolapse as well and pelvic exam can be performed at time of cystoscopy. Schedule cystoscopy and possible pelvic exam       Position: Dorsal Lithotomy  EBL: 0 ml    Findings:   The patient was prepped and draped in the usual sterile fashion. The cystoscope was advanced through the urethra and into the bladder. The bladder was thoroughly inspected and the following was noted:    Vagina: normal appearing vagina with normal color and discharge, no lesions  Residual Urine: none. Urine clear, with no obvious infection  Urethra: normal appearing urethra with no masses, tenderness or lesions    Bladder: no tumor noted . no bladder diverticulum. Ureters: Orifices with normal configuration and location. The cystoscope was removed. The patient tolerated the procedure well.   Cysto negative  No prolapse  Cont post coital abx- has not had an infection since last OV    F/u rose six months

## 2022-08-18 ENCOUNTER — TELEPHONE (OUTPATIENT)
Dept: UROLOGY | Age: 37
End: 2022-08-18

## 2023-05-05 ENCOUNTER — OFFICE VISIT (OUTPATIENT)
Dept: FAMILY MEDICINE CLINIC | Age: 38
End: 2023-05-05

## 2023-05-05 VITALS
BODY MASS INDEX: 17.29 KG/M2 | SYSTOLIC BLOOD PRESSURE: 96 MMHG | WEIGHT: 107.6 LBS | OXYGEN SATURATION: 98 % | HEART RATE: 76 BPM | TEMPERATURE: 98.6 F | DIASTOLIC BLOOD PRESSURE: 68 MMHG | HEIGHT: 66 IN | RESPIRATION RATE: 18 BRPM

## 2023-05-05 DIAGNOSIS — R31.29 OTHER MICROSCOPIC HEMATURIA: ICD-10-CM

## 2023-05-05 DIAGNOSIS — Z87.442 HISTORY OF KIDNEY STONES: ICD-10-CM

## 2023-05-05 DIAGNOSIS — R31.21 ASYMPTOMATIC MICROSCOPIC HEMATURIA: ICD-10-CM

## 2023-05-05 DIAGNOSIS — R10.30 LOWER ABDOMINAL PAIN: ICD-10-CM

## 2023-05-05 DIAGNOSIS — R39.9 UTI SYMPTOMS: Primary | ICD-10-CM

## 2023-05-05 DIAGNOSIS — R10.9 FLANK PAIN: ICD-10-CM

## 2023-05-05 LAB
BACTERIA: ABNORMAL
BILIRUB UR QL STRIP: NEGATIVE
BILIRUBIN, POC: NEGATIVE
BLOOD URINE, POC: NORMAL
CASTS #/AREA URNS LPF: ABNORMAL /LPF
CASTS #/AREA URNS LPF: ABNORMAL /LPF
CHARACTER UR: CLEAR
CHARCOAL URNS QL MICRO: ABNORMAL
CLARITY, POC: CLEAR
COLOR UR: YELLOW
COLOR, POC: YELLOW
CRYSTALS URNS QL MICRO: ABNORMAL
EPITHELIAL CELLS, UA: ABNORMAL /HPF
GLUCOSE UR QL STRIP.AUTO: NEGATIVE MG/DL
GLUCOSE URINE, POC: NEGATIVE
HGB UR QL STRIP.AUTO: ABNORMAL
KETONES UR QL STRIP.AUTO: NEGATIVE
KETONES, POC: NEGATIVE
LEUKOCYTE EST, POC: NORMAL
LEUKOCYTE ESTERASE UR QL STRIP.AUTO: ABNORMAL
NITRITE UR QL STRIP.AUTO: NEGATIVE
NITRITE, POC: NEGATIVE
PH UR STRIP.AUTO: 6.5 [PH] (ref 5–9)
PH, POC: 6.5
PROT UR STRIP.AUTO-MCNC: NEGATIVE MG/DL
PROTEIN, POC: NEGATIVE
RBC #/AREA URNS HPF: ABNORMAL /HPF
RENAL EPI CELLS #/AREA URNS HPF: ABNORMAL /[HPF]
SPECIFIC GRAVITY UA: 1.03 (ref 1–1.03)
SPECIFIC GRAVITY, POC: 1.02
UROBILINOGEN, POC: 1
UROBILINOGEN, URINE: 1 EU/DL (ref 0–1)
WBC #/AREA URNS HPF: ABNORMAL /HPF
YEAST LIKE FUNGI URNS QL MICRO: ABNORMAL

## 2023-05-05 RX ORDER — TAMSULOSIN HYDROCHLORIDE 0.4 MG/1
0.4 CAPSULE ORAL DAILY
Qty: 30 CAPSULE | Refills: 0 | Status: SHIPPED | OUTPATIENT
Start: 2023-05-05

## 2023-05-05 RX ORDER — CIPROFLOXACIN 500 MG/1
500 TABLET, FILM COATED ORAL 2 TIMES DAILY
Qty: 10 TABLET | Refills: 0 | Status: SHIPPED | OUTPATIENT
Start: 2023-05-05 | End: 2023-05-10

## 2023-05-05 SDOH — ECONOMIC STABILITY: INCOME INSECURITY: HOW HARD IS IT FOR YOU TO PAY FOR THE VERY BASICS LIKE FOOD, HOUSING, MEDICAL CARE, AND HEATING?: NOT HARD AT ALL

## 2023-05-05 SDOH — ECONOMIC STABILITY: FOOD INSECURITY: WITHIN THE PAST 12 MONTHS, YOU WORRIED THAT YOUR FOOD WOULD RUN OUT BEFORE YOU GOT MONEY TO BUY MORE.: NEVER TRUE

## 2023-05-05 SDOH — ECONOMIC STABILITY: FOOD INSECURITY: WITHIN THE PAST 12 MONTHS, THE FOOD YOU BOUGHT JUST DIDN'T LAST AND YOU DIDN'T HAVE MONEY TO GET MORE.: NEVER TRUE

## 2023-05-05 SDOH — ECONOMIC STABILITY: HOUSING INSECURITY
IN THE LAST 12 MONTHS, WAS THERE A TIME WHEN YOU DID NOT HAVE A STEADY PLACE TO SLEEP OR SLEPT IN A SHELTER (INCLUDING NOW)?: NO

## 2023-05-05 ASSESSMENT — ENCOUNTER SYMPTOMS
VOMITING: 0
EYE DISCHARGE: 0
CONSTIPATION: 0
EYE ITCHING: 0
CHOKING: 0
COUGH: 0
EYE PAIN: 0
COLOR CHANGE: 0
NAUSEA: 0
SINUS PRESSURE: 0
ABDOMINAL PAIN: 1
BACK PAIN: 0
CHEST TIGHTNESS: 0
WHEEZING: 0
VOICE CHANGE: 0
SHORTNESS OF BREATH: 0
ABDOMINAL DISTENTION: 0

## 2023-05-05 ASSESSMENT — PATIENT HEALTH QUESTIONNAIRE - PHQ9
SUM OF ALL RESPONSES TO PHQ QUESTIONS 1-9: 0
2. FEELING DOWN, DEPRESSED OR HOPELESS: 0
SUM OF ALL RESPONSES TO PHQ QUESTIONS 1-9: 0
1. LITTLE INTEREST OR PLEASURE IN DOING THINGS: 0
SUM OF ALL RESPONSES TO PHQ QUESTIONS 1-9: 0
SUM OF ALL RESPONSES TO PHQ QUESTIONS 1-9: 0
SUM OF ALL RESPONSES TO PHQ9 QUESTIONS 1 & 2: 0

## 2023-05-05 NOTE — PROGRESS NOTES
[address] [address]  Dept: [phone]  Loc: [address] (:  1985) is a 34 y.o. female,Established patient, here for evaluation of the following chief complaint(s):  Abdominal Pain (Right side started last night around midnight), Pelvic Pain (Started last night), Hematuria (After pain started last night, pt got up to go to restroom and noticed a lot of blood in her urine. Did not notice blood this morning. Not a lot of urine output, no urgency or burning noted. ), and Other (Having hot flashes last night after pain started and they have been off and on. )      ASSESSMENT/PLAN:  1. UTI symptoms  Patient with complaints of pelvic pain and noticed hematuria that started last night, she has a hx of kidney stones several years ago. She reports she is passing urine, but is less urine. No visible blood in the urine, microscopic urine noted in office ua. Afebrile, Pt non toxic appearing and in no acute distress. Abdomen with tenderness in suprapubic region, slight right sided flank pain  No CVA tenderness. Urine with trace leuks, large amount of blood. KUB today revealed constipation, no stones seen on xray  Rx Cipro and Flomax  If symptoms persist or worsen she will need further evaluation and likely a CT, and advised to go to the ER. Patient voiced clear understanding.   -     POCT Urinalysis No Micro (Auto)  -     ciprofloxacin (CIPRO) 500 MG tablet; Take 1 tablet by mouth 2 times daily for 5 days, Disp-10 tablet, R-0Normal  -     tamsulosin (FLOMAX) 0.4 MG capsule; Take 1 capsule by mouth daily, Disp-30 capsule, R-0Normal  -     Culture, Urine  -     Urinalysis with Microscopic; Future  2. Lower abdominal pain  -     Culture, Urine  -     Urinalysis with Microscopic; Future  3. Flank pain  -     XR ABDOMEN (KUB) (SINGLE AP VIEW); Future  -     tamsulosin (FLOMAX) 0.4 MG capsule;  Take 1

## 2023-05-07 LAB
BACTERIA UR CULT: ABNORMAL
BACTERIA UR CULT: ABNORMAL
ORGANISM: ABNORMAL

## 2023-05-08 LAB
BACTERIA UR CULT: ABNORMAL
BACTERIA UR CULT: ABNORMAL
ORGANISM: ABNORMAL

## 2023-05-17 ENCOUNTER — NURSE ONLY (OUTPATIENT)
Dept: FAMILY MEDICINE CLINIC | Age: 38
End: 2023-05-17
Payer: COMMERCIAL

## 2023-05-17 ENCOUNTER — OFFICE VISIT (OUTPATIENT)
Dept: FAMILY MEDICINE CLINIC | Age: 38
End: 2023-05-17
Payer: COMMERCIAL

## 2023-05-17 ENCOUNTER — TELEPHONE (OUTPATIENT)
Dept: FAMILY MEDICINE CLINIC | Age: 38
End: 2023-05-17

## 2023-05-17 VITALS
RESPIRATION RATE: 16 BRPM | DIASTOLIC BLOOD PRESSURE: 66 MMHG | OXYGEN SATURATION: 92 % | WEIGHT: 109.4 LBS | HEART RATE: 86 BPM | SYSTOLIC BLOOD PRESSURE: 92 MMHG | BODY MASS INDEX: 17.58 KG/M2 | HEIGHT: 66 IN

## 2023-05-17 DIAGNOSIS — R31.9 HEMATURIA, UNSPECIFIED TYPE: ICD-10-CM

## 2023-05-17 DIAGNOSIS — R10.84 GENERALIZED ABDOMINAL PAIN: ICD-10-CM

## 2023-05-17 DIAGNOSIS — Z12.4 CERVICAL CANCER SCREENING: ICD-10-CM

## 2023-05-17 DIAGNOSIS — R23.2 HOT FLASHES: ICD-10-CM

## 2023-05-17 DIAGNOSIS — E78.5 HYPERLIPIDEMIA, UNSPECIFIED HYPERLIPIDEMIA TYPE: ICD-10-CM

## 2023-05-17 DIAGNOSIS — C73 MALIGNANT TUMOR OF THYROID GLAND (HCC): ICD-10-CM

## 2023-05-17 DIAGNOSIS — E89.0 HX OF THYROIDECTOMY: ICD-10-CM

## 2023-05-17 DIAGNOSIS — N94.10 PAIN IN FEMALE GENITALIA ON INTERCOURSE: ICD-10-CM

## 2023-05-17 DIAGNOSIS — Z00.00 WELLNESS EXAMINATION: Primary | ICD-10-CM

## 2023-05-17 DIAGNOSIS — C73 THYROID CANCER (HCC): Primary | ICD-10-CM

## 2023-05-17 DIAGNOSIS — R30.0 DYSURIA: ICD-10-CM

## 2023-05-17 LAB
ALBUMIN SERPL BCG-MCNC: 4.6 G/DL (ref 3.5–5.1)
ALP SERPL-CCNC: 60 U/L (ref 38–126)
ALT SERPL W/O P-5'-P-CCNC: 19 U/L (ref 11–66)
ANION GAP SERPL CALC-SCNC: 12 MEQ/L (ref 8–16)
AST SERPL-CCNC: 19 U/L (ref 5–40)
BACTERIA: ABNORMAL
BASOPHILS ABSOLUTE: 0 THOU/MM3 (ref 0–0.1)
BASOPHILS NFR BLD AUTO: 0.3 %
BILIRUB SERPL-MCNC: 0.4 MG/DL (ref 0.3–1.2)
BILIRUB UR QL STRIP: NEGATIVE
BILIRUBIN, POC: NORMAL
BLOOD URINE, POC: NORMAL
BUN SERPL-MCNC: 13 MG/DL (ref 7–22)
CALCIUM SERPL-MCNC: 9.2 MG/DL (ref 8.5–10.5)
CASTS #/AREA URNS LPF: ABNORMAL /LPF
CASTS #/AREA URNS LPF: ABNORMAL /LPF
CHARACTER UR: CLEAR
CHARCOAL URNS QL MICRO: ABNORMAL
CHLORIDE SERPL-SCNC: 107 MEQ/L (ref 98–111)
CHOLESTEROL, FASTING: 189 MG/DL (ref 100–199)
CLARITY, POC: CLEAR
CO2 SERPL-SCNC: 22 MEQ/L (ref 23–33)
COLOR UR: YELLOW
COLOR, POC: NORMAL
CREAT SERPL-MCNC: 0.5 MG/DL (ref 0.4–1.2)
CRYSTALS URNS QL MICRO: ABNORMAL
DEPRECATED RDW RBC AUTO: 46.3 FL (ref 35–45)
EOSINOPHIL NFR BLD AUTO: 2.1 %
EOSINOPHILS ABSOLUTE: 0.2 THOU/MM3 (ref 0–0.4)
EPITHELIAL CELLS, UA: ABNORMAL /HPF
ERYTHROCYTE [DISTWIDTH] IN BLOOD BY AUTOMATED COUNT: 13.2 % (ref 11.5–14.5)
FOLLICLE STIMULATING HORMONE: 32.2 MIU/ML (ref 1.7–21.5)
GFR SERPL CREATININE-BSD FRML MDRD: > 60 ML/MIN/1.73M2
GLUCOSE FASTING: 90 MG/DL (ref 70–108)
GLUCOSE UR QL STRIP.AUTO: NEGATIVE MG/DL
GLUCOSE URINE, POC: NORMAL
HCT VFR BLD AUTO: 42 % (ref 37–47)
HDLC SERPL-MCNC: 58 MG/DL
HGB BLD-MCNC: 13.7 GM/DL (ref 12–16)
HGB UR QL STRIP.AUTO: ABNORMAL
IMM GRANULOCYTES # BLD AUTO: 0.01 THOU/MM3 (ref 0–0.07)
IMM GRANULOCYTES NFR BLD AUTO: 0.1 %
KETONES UR QL STRIP.AUTO: NEGATIVE
KETONES, POC: NORMAL
LDLC SERPL CALC-MCNC: 111 MG/DL
LEUKOCYTE EST, POC: NORMAL
LEUKOCYTE ESTERASE UR QL STRIP.AUTO: NEGATIVE
LYMPHOCYTES ABSOLUTE: 2.3 THOU/MM3 (ref 1–4.8)
LYMPHOCYTES NFR BLD AUTO: 32.2 %
MCH RBC QN AUTO: 30.9 PG (ref 26–33)
MCHC RBC AUTO-ENTMCNC: 32.6 GM/DL (ref 32.2–35.5)
MCV RBC AUTO: 94.8 FL (ref 81–99)
MONOCYTES ABSOLUTE: 0.7 THOU/MM3 (ref 0.4–1.3)
MONOCYTES NFR BLD AUTO: 9.7 %
NEUTROPHILS NFR BLD AUTO: 55.6 %
NITRITE UR QL STRIP.AUTO: NEGATIVE
NITRITE, POC: NORMAL
NRBC BLD AUTO-RTO: 0 /100 WBC
PH UR STRIP.AUTO: 6 [PH] (ref 5–9)
PH, POC: 6
PLATELET # BLD AUTO: 212 THOU/MM3 (ref 130–400)
PMV BLD AUTO: 10.1 FL (ref 9.4–12.4)
POTASSIUM SERPL-SCNC: 4.2 MEQ/L (ref 3.5–5.2)
PROLACTIN SERPL-MCNC: 17.8 NG/ML
PROT SERPL-MCNC: 6.8 G/DL (ref 6.1–8)
PROT UR STRIP.AUTO-MCNC: NEGATIVE MG/DL
PROTEIN, POC: NORMAL
RBC # BLD AUTO: 4.43 MILL/MM3 (ref 4.2–5.4)
RBC #/AREA URNS HPF: ABNORMAL /HPF
RENAL EPI CELLS #/AREA URNS HPF: ABNORMAL /[HPF]
SEGMENTED NEUTROPHILS ABSOLUTE COUNT: 4 THOU/MM3 (ref 1.8–7.7)
SODIUM SERPL-SCNC: 141 MEQ/L (ref 135–145)
SPECIFIC GRAVITY UA: 1.02 (ref 1–1.03)
SPECIFIC GRAVITY, POC: 1.02
T3FREE SERPL-MCNC: 2.76 PG/ML (ref 2.02–4.43)
T4 FREE SERPL-MCNC: 1.27 NG/DL (ref 0.93–1.76)
TRIGLYCERIDE, FASTING: 99 MG/DL (ref 0–199)
TSH SERPL DL<=0.005 MIU/L-ACNC: 0.36 UIU/ML (ref 0.4–4.2)
UROBILINOGEN, POC: 0.2
UROBILINOGEN, URINE: 0.2 EU/DL (ref 0–1)
WBC # BLD AUTO: 7.2 THOU/MM3 (ref 4.8–10.8)
WBC #/AREA URNS HPF: ABNORMAL /HPF
YEAST LIKE FUNGI URNS QL MICRO: ABNORMAL

## 2023-05-17 PROCEDURE — 81003 URINALYSIS AUTO W/O SCOPE: CPT | Performed by: NURSE PRACTITIONER

## 2023-05-17 PROCEDURE — 36415 COLL VENOUS BLD VENIPUNCTURE: CPT | Performed by: NURSE PRACTITIONER

## 2023-05-17 PROCEDURE — 99396 PREV VISIT EST AGE 40-64: CPT | Performed by: NURSE PRACTITIONER

## 2023-05-17 NOTE — RESULT ENCOUNTER NOTE
Let Nida Sanchez know that her urine did not show any bacteria, trace blood, will send for urine microscopic

## 2023-05-17 NOTE — PROGRESS NOTES
Health Maintenance Due   Topic Date Due    COVID-19 Vaccine (1) Never done    Pneumococcal 0-64 years Vaccine (1 - PCV) Never done    DTaP/Tdap/Td vaccine (1 - Tdap) Never done
Venipuncture obtained from right arm. Patient tolerated the procedure without complications or complaints.
left inguinal adenopathy. Skin:     General: Skin is warm and dry. Capillary Refill: Capillary refill takes less than 2 seconds. Neurological:      General: No focal deficit present. Mental Status: She is alert and oriented to person, place, and time. Deep Tendon Reflexes: Reflexes are normal and symmetric. Psychiatric:         Mood and Affect: Mood normal.       No flowsheet data found. Lab Results   Component Value Date/Time    CHOLFAST 189 05/17/2023 10:21 AM    CHOLFAST 180 03/08/2021 09:57 AM    TRIGLYCFAST 99 05/17/2023 10:21 AM    TRIGLYCFAST 107 03/08/2021 09:57 AM    HDL 58 05/17/2023 10:21 AM    HDL 42 03/08/2021 09:57 AM    LDLCALC 111 05/17/2023 10:21 AM    LDLCALC 117 03/08/2021 09:57 AM    GLUF 90 05/17/2023 10:21 AM    GLUCOSE 63 03/04/2022 12:00 AM       The 10-year ASCVD risk score (Maciej GAITAN, et al., 2019) is: 1%    Values used to calculate the score:      Age: 34 years      Sex: Female      Is Non- : No      Diabetic: No      Tobacco smoker: Yes      Systolic Blood Pressure: 92 mmHg      Is BP treated: No      HDL Cholesterol: 58 mg/dL      Total Cholesterol: 189 mg/dl      There is no immunization history on file for this patient. Health Maintenance   Topic Date Due    COVID-19 Vaccine (1) Never done    Pneumococcal 0-64 years Vaccine (1 - PCV) Never done    DTaP/Tdap/Td vaccine (1 - Tdap) Never done    Flu vaccine (Season Ended) 08/01/2023    Depression Screen  05/05/2024    Lipids  05/17/2028    Hepatitis C screen  Completed    HIV screen  Completed    Hepatitis A vaccine  Aged Out    Hib vaccine  Aged Out    Meningococcal (ACWY) vaccine  Aged Out    Varicella vaccine  Discontinued       Assessment & Plan   Wellness examination  Encouraged to obtain 8 hours of sleep, 30 minutes of daily exercise. Drink 64 ounces of water daily. Follow a healthy well balanced diet. Cervical cancer screening  -     PAP SMEAR  Dysuria  Ua no leuk's today.    -

## 2023-05-17 NOTE — TELEPHONE ENCOUNTER
----- Message from SILVIO Funez - CNP sent at 5/17/2023 11:09 AM EDT -----  Let Kenny Coronado know that her urine did not show any bacteria, trace blood, will send for urine microscopic

## 2023-05-18 ENCOUNTER — TELEPHONE (OUTPATIENT)
Dept: FAMILY MEDICINE CLINIC | Age: 38
End: 2023-05-18

## 2023-05-18 PROBLEM — Z90.89 HX OF THYROIDECTOMY: Status: ACTIVE | Noted: 2023-05-18

## 2023-05-18 PROBLEM — E89.0 HX OF THYROIDECTOMY: Status: ACTIVE | Noted: 2023-05-18

## 2023-05-18 PROBLEM — Z98.890 HX OF THYROIDECTOMY: Status: ACTIVE | Noted: 2023-05-18

## 2023-05-18 LAB
CANDIDA SPECIES, DNA PROBE: NEGATIVE
GARDNERELLA VAGINALIS, DNA PROBE: NEGATIVE
SOURCE: NORMAL
TRICHOMONAS VAGINALIS DNA: NEGATIVE

## 2023-05-18 ASSESSMENT — ENCOUNTER SYMPTOMS
CHEST TIGHTNESS: 0
EYE DISCHARGE: 0
SINUS PRESSURE: 0
NAUSEA: 0
COLOR CHANGE: 0
ABDOMINAL DISTENTION: 0
BACK PAIN: 0
SHORTNESS OF BREATH: 0
ABDOMINAL PAIN: 1
VOICE CHANGE: 0
CONSTIPATION: 0
CHOKING: 0
EYE ITCHING: 0
WHEEZING: 0
EYE PAIN: 0
VOMITING: 0
COUGH: 0

## 2023-05-18 NOTE — TELEPHONE ENCOUNTER
----- Message from SILVIO Dodson CNP sent at 5/18/2023  8:40 AM EDT -----  Good news the urine microscopic looked good. Lipid panel, remains stable. No need for cholesterol medication, although with her being a smoker, I do recommend her to start a daily Omega 3 Fish Oil 1000 mg daily and the below:  Lifestyle Recommendations  AHA/ACC guidelines stress the importance of lifestyle modifications to lower cardiovascular disease risk. This includes eating a heart-healthy diet, regular aerobic exercises, maintenance of desirable body weight and avoidance of tobacco products. Her hormone levels do support that she is likely in menopause. I am still waiting on the Vaginal US results.  Thank you

## 2023-05-18 NOTE — RESULT ENCOUNTER NOTE
Good news the urine microscopic looked good. Lipid panel, remains stable. No need for cholesterol medication, although with her being a smoker, I do recommend her to start a daily Omega 3 Fish Oil 1000 mg daily and the below:  Lifestyle Recommendations  AHA/ACC guidelines stress the importance of lifestyle modifications to lower cardiovascular disease risk. This includes eating a heart-healthy diet, regular aerobic exercises, maintenance of desirable body weight and avoidance of tobacco products. Her hormone levels do support that she is likely in menopause. I am still waiting on the Vaginal US results.  Thank you

## 2023-05-23 ENCOUNTER — HOSPITAL ENCOUNTER (OUTPATIENT)
Dept: ULTRASOUND IMAGING | Age: 38
Discharge: HOME | End: 2023-05-23
Payer: COMMERCIAL

## 2023-05-23 ENCOUNTER — TELEPHONE (OUTPATIENT)
Dept: FAMILY MEDICINE CLINIC | Age: 38
End: 2023-05-23

## 2023-05-23 DIAGNOSIS — N94.10 PAIN IN FEMALE GENITALIA ON INTERCOURSE: ICD-10-CM

## 2023-05-23 PROCEDURE — 76830 TRANSVAGINAL US NON-OB: CPT

## 2023-05-23 NOTE — TELEPHONE ENCOUNTER
Pt was called and notified of negative results.  Pt got US done today at [facility]

## 2023-05-23 NOTE — TELEPHONE ENCOUNTER
----- Message from SILVIO Santizo CNP sent at 5/23/2023  2:55 PM EDT -----  Good news, Vaginal swab negative for yeast, bacterial vaginitis and Trichomonas. Has she gotten the vaginal Ultrasound yet?

## 2023-05-24 LAB — CYTOLOGY THIN PREP PAP: NORMAL

## 2023-05-25 ENCOUNTER — TELEPHONE (OUTPATIENT)
Dept: FAMILY MEDICINE CLINIC | Age: 38
End: 2023-05-25

## 2023-05-25 DIAGNOSIS — Z87.442 HISTORY OF KIDNEY STONES: ICD-10-CM

## 2023-05-25 DIAGNOSIS — R31.9 HEMATURIA, UNSPECIFIED TYPE: ICD-10-CM

## 2023-05-25 DIAGNOSIS — R10.30 LOWER ABDOMINAL PAIN: Primary | ICD-10-CM

## 2023-05-30 ENCOUNTER — TELEPHONE (OUTPATIENT)
Dept: FAMILY MEDICINE CLINIC | Age: 38
End: 2023-05-30

## 2023-05-30 RX ORDER — TAMSULOSIN HYDROCHLORIDE 0.4 MG/1
CAPSULE ORAL
Qty: 30 CAPSULE | OUTPATIENT
Start: 2023-05-30

## 2023-05-30 NOTE — TELEPHONE ENCOUNTER
Patricio Monterroso, APRN - CNP 27 minutes ago (12:46 PM)     AN  Correct, it is not an active Rx. Can you look into if the Ct abd/pelvis has been scheduled.  I ordered it on 5/25  Thank you         Note

## 2023-05-30 NOTE — TELEPHONE ENCOUNTER
Patient's last appointment was : 5/17/2023  Patient's next appointment is : 5/30/2023  Last refilled: Medication has been D/C'd, just confirming it is no longer an active script.

## 2023-05-30 NOTE — TELEPHONE ENCOUNTER
Spoke with patient- she wants to have done at [facility]- patient had no questions or concerns to discuss today- will call back to reschedule after CT is scheduled

## 2023-05-30 NOTE — TELEPHONE ENCOUNTER
Spoke with patient- she wants to have done at [facility]- patient had no questions or concerns to discuss today- will call back to reschedule after CT is scheduled

## 2023-05-30 NOTE — TELEPHONE ENCOUNTER
Left message on patient's voicemail to return call to office. Central scheduling has not reached out to patient yet at this time, would she like to be pushed through to central scheduling. Patient also has appointment today to discuss results, does patient have questions or concerns that she would like to see Windy ALVARADO today or would she like to reschedule after CT is complete?

## 2023-05-30 NOTE — TELEPHONE ENCOUNTER
Correct, it is not an active Rx. Can you look into if the Ct abd/pelvis has been scheduled.  I ordered it on 5/25  Thank you

## 2023-06-13 ENCOUNTER — TELEPHONE (OUTPATIENT)
Dept: FAMILY MEDICINE CLINIC | Age: 38
End: 2023-06-13

## 2023-10-27 ENCOUNTER — NURSE ONLY (OUTPATIENT)
Dept: FAMILY MEDICINE CLINIC | Age: 38
End: 2023-10-27

## 2023-10-27 DIAGNOSIS — C73 MALIGNANT NEOPLASM OF THYROID GLAND (HCC): Primary | ICD-10-CM

## 2023-10-27 LAB
T4 FREE SERPL-MCNC: 1.13 NG/DL (ref 0.93–1.76)
TSH SERPL DL<=0.005 MIU/L-ACNC: 17.09 UIU/ML (ref 0.4–4.2)

## 2023-11-03 ENCOUNTER — TELEPHONE (OUTPATIENT)
Dept: FAMILY MEDICINE CLINIC | Age: 38
End: 2023-11-03

## 2023-11-03 DIAGNOSIS — E05.90 HYPERTHYROIDISM: ICD-10-CM

## 2023-11-03 DIAGNOSIS — E89.0 HX OF THYROIDECTOMY: ICD-10-CM

## 2023-11-03 DIAGNOSIS — C73 THYROID CANCER (HCC): Primary | ICD-10-CM

## 2023-11-03 NOTE — TELEPHONE ENCOUNTER
----- Message from SILVIO Escalante CNP sent at 11/2/2023  5:54 PM EDT -----  Please confirm with patient that her specialist is managing her thyroid medication, as her recent level is very high.

## 2023-11-03 NOTE — TELEPHONE ENCOUNTER
Perfect, I will order for her levels to be rechecked.  If she wants to get those labs done before her appointment that would be great and then we can discuss if a medication change needs to happen

## 2023-11-03 NOTE — TELEPHONE ENCOUNTER
Patient states she is still seeing her specialist, but they are retiring so she is needing to find a local endocrinologist. They did fill a 3 month supply for her and kept her on the same dose as she stated they were convinced she wasn't taking her medications. Patient stated she counted medications and maybe missed a few doses, but nothing to make her levels that increased. Patient states she is unsure if going through menopause could have affected her levels. Patient requested appointment and is coming into office on Tuesday.

## 2023-11-06 ENCOUNTER — NURSE ONLY (OUTPATIENT)
Dept: FAMILY MEDICINE CLINIC | Age: 38
End: 2023-11-06
Payer: COMMERCIAL

## 2023-11-06 DIAGNOSIS — E05.90 HYPERTHYROIDISM: ICD-10-CM

## 2023-11-06 DIAGNOSIS — E89.0 HX OF THYROIDECTOMY: ICD-10-CM

## 2023-11-06 DIAGNOSIS — C73 THYROID CANCER (HCC): ICD-10-CM

## 2023-11-06 LAB
T4 FREE SERPL-MCNC: 1.35 NG/DL (ref 0.93–1.76)
TSH SERPL DL<=0.005 MIU/L-ACNC: 6.53 UIU/ML (ref 0.4–4.2)

## 2023-11-06 PROCEDURE — 36415 COLL VENOUS BLD VENIPUNCTURE: CPT | Performed by: STUDENT IN AN ORGANIZED HEALTH CARE EDUCATION/TRAINING PROGRAM

## 2023-11-06 NOTE — RESULT ENCOUNTER NOTE
Reviewed, will discuss at her f/u tomorrow. [FreeTextEntry1] : Cerumen impaction, now resolved.  The patient was counseled in aural hygiene and Qtip avoidance.  I offered an audiogram, and we discussed that the results had not significantly changed.  I believe the crunchiness was caused by some cerumen.  Recommend he follow-up on a yearly or biannual basis, sooner if issues arise.  He is not interested in augmentation at this time.  Discussed auditory protection and he has custom earplugs\par \par

## 2023-11-07 ENCOUNTER — OFFICE VISIT (OUTPATIENT)
Dept: FAMILY MEDICINE CLINIC | Age: 38
End: 2023-11-07
Payer: COMMERCIAL

## 2023-11-07 VITALS
HEART RATE: 84 BPM | HEIGHT: 66 IN | BODY MASS INDEX: 18.99 KG/M2 | OXYGEN SATURATION: 97 % | WEIGHT: 118.2 LBS | SYSTOLIC BLOOD PRESSURE: 98 MMHG | DIASTOLIC BLOOD PRESSURE: 68 MMHG | RESPIRATION RATE: 18 BRPM

## 2023-11-07 DIAGNOSIS — C73 MALIGNANT TUMOR OF THYROID GLAND (HCC): ICD-10-CM

## 2023-11-07 DIAGNOSIS — N28.9 RENAL LESION: ICD-10-CM

## 2023-11-07 DIAGNOSIS — E07.9 THYROID DISEASE: Primary | ICD-10-CM

## 2023-11-07 DIAGNOSIS — E89.0 HX OF THYROIDECTOMY: ICD-10-CM

## 2023-11-07 PROCEDURE — 99214 OFFICE O/P EST MOD 30 MIN: CPT | Performed by: NURSE PRACTITIONER

## 2023-11-07 RX ORDER — LEVOTHYROXINE SODIUM 88 UG/1
TABLET ORAL
COMMUNITY
Start: 2023-11-02

## 2023-11-07 ASSESSMENT — ENCOUNTER SYMPTOMS
EYE DISCHARGE: 0
VOICE CHANGE: 0
WHEEZING: 0
VOMITING: 0
EYE ITCHING: 0
ABDOMINAL DISTENTION: 0
CONSTIPATION: 0
COLOR CHANGE: 0
SHORTNESS OF BREATH: 0
SINUS PRESSURE: 0
EYE PAIN: 0
COUGH: 0
CHEST TIGHTNESS: 0
CHOKING: 0
BACK PAIN: 0
ABDOMINAL PAIN: 0
NAUSEA: 0

## 2023-11-07 NOTE — PROGRESS NOTES
[address] [address]  Dept: [phone]  Loc: [address] [facility] (:  1985) is a 34 y.o. female,Established patient, here for evaluation of the following chief complaint(s):  Follow-up (Discuss thyroid medications- find new local endocrinologist as hers is retiring. )      ASSESSMENT/PLAN:  1. Thyroid disease  -     TSH; Future  -     T4, Free; Future  -     AFL (Epic) - Priscila Arguello MD, Endocrinology, [facility]  2. Malignant tumor of thyroid gland ([address])  3. Hx of thyroidectomy  -     TSH; Future  -     T4, Free; Future  -     AFL (Epic) - Priscila Arguello MD, Endocrinology, [facility]  4. Renal Lesion  CT abd/Pelvis from , noted \"There are couple of millimetric hypodense cystic lesions in the   bilateral kidneys. \"   Called Radiology and MD that read this is no longer working at [facility], spoke with Dr. Brien Greene, and she was not able to provide any recommendation for further work up, as she did not read the image. I will order a repeat CT of pelvis, to better see and get sizing etc on the lesions. Referral to Endocrinologist placed, her previous one is retiring. In Feb levothyroxine dose was changed from 100 mch to 88. In may low, no adjustment made, rechecked in October was 16, recheked yesterday and   Lab Results   Component Value Date    TSH 6.530 (H) 2023   Will recheck TSH, T4 in 1 week, then decide on dose change. At this time continue levothyroxine 88 mcg dose. Time spent 30 minutes     Health maintenance labs and vaccines declined at today's visit. Return in about 1 week (around 2023) for return for labs . SUBJECTIVE/OBJECTIVE:  Dose was decreased in Feb was taking 100 changed to 80          has a past medical history of Asthma, Cancer ([address]), Hyperlipidemia, Kidney stones, PONV (postoperative nausea and vomiting), and Thyroid disease.     Review of Systems

## 2023-11-07 NOTE — PROGRESS NOTES
Health Maintenance Due   Topic Date Due    Hepatitis B vaccine (1 of 3 - 3-dose series) Never done    COVID-19 Vaccine (1) Never done    Pneumococcal 0-64 years Vaccine (1 - PCV) Never done    DTaP/Tdap/Td vaccine (1 - Tdap) Never done    Flu vaccine (1) Never done

## 2023-11-08 ENCOUNTER — TELEPHONE (OUTPATIENT)
Dept: FAMILY MEDICINE CLINIC | Age: 38
End: 2023-11-08

## 2023-11-08 DIAGNOSIS — N28.9 KIDNEY LESION: Primary | ICD-10-CM

## 2023-11-08 NOTE — TELEPHONE ENCOUNTER
Notify patient of the below. CT abd/Pelvis from June, noted \"There are couple of millimetric hypodense cystic lesions in the   bilateral kidneys. \"   Called Radiology and MD that read this is no longer working at [facility], spoke with Dr. Dannielle Carreno, and she was not able to provide any recommendation for further work up, as she did not read the image. I will order a repeat CT of pelvis, to better see and get sizing etc on the lesions.

## 2023-11-15 ENCOUNTER — NURSE ONLY (OUTPATIENT)
Dept: FAMILY MEDICINE CLINIC | Age: 38
End: 2023-11-15
Payer: COMMERCIAL

## 2023-11-15 DIAGNOSIS — E07.9 THYROID DISEASE: ICD-10-CM

## 2023-11-15 DIAGNOSIS — E89.0 HX OF THYROIDECTOMY: ICD-10-CM

## 2023-11-15 LAB
T4 FREE SERPL-MCNC: 1.28 NG/DL (ref 0.93–1.76)
TSH SERPL DL<=0.005 MIU/L-ACNC: 6.77 UIU/ML (ref 0.4–4.2)

## 2023-11-15 PROCEDURE — 36415 COLL VENOUS BLD VENIPUNCTURE: CPT | Performed by: NURSE PRACTITIONER

## 2023-11-21 RX ORDER — LEVOTHYROXINE SODIUM 0.1 MG/1
100 TABLET ORAL DAILY
Qty: 30 TABLET | Refills: 11 | Status: SHIPPED | OUTPATIENT
Start: 2023-11-21 | End: 2024-11-20

## 2023-11-27 ENCOUNTER — HOSPITAL ENCOUNTER (OUTPATIENT)
Dept: CT IMAGING | Age: 38
Discharge: HOME | End: 2023-11-27
Payer: COMMERCIAL

## 2023-11-27 DIAGNOSIS — N28.9 KIDNEY LESION: ICD-10-CM

## 2023-11-27 PROCEDURE — 6360000004 HC RX CONTRAST MEDICATION: Performed by: NURSE PRACTITIONER

## 2023-11-27 PROCEDURE — 74178 CT ABD&PLV WO CNTR FLWD CNTR: CPT

## 2023-11-27 RX ADMIN — IOPAMIDOL 100 ML: 755 INJECTION, SOLUTION INTRAVENOUS at 07:20

## 2023-11-29 NOTE — RESULT ENCOUNTER NOTE
Please notify Abelardo Shaikh that her CT showed the below: The bilateral renal lesions represent simple cysts and although too small to characterize definitively, interval stability favors benignity.  Radiologist recommends a A one-year CT abdomen and pelvis may be obtained

## 2023-12-28 ENCOUNTER — NURSE ONLY (OUTPATIENT)
Dept: FAMILY MEDICINE CLINIC | Age: 38
End: 2023-12-28
Payer: COMMERCIAL

## 2023-12-28 DIAGNOSIS — E89.0 HX OF THYROIDECTOMY: Primary | ICD-10-CM

## 2023-12-28 DIAGNOSIS — E07.9 THYROID DISEASE: ICD-10-CM

## 2023-12-28 DIAGNOSIS — E07.9 THYROID DISEASE: Primary | ICD-10-CM

## 2023-12-28 DIAGNOSIS — E89.0 HX OF THYROIDECTOMY: ICD-10-CM

## 2023-12-28 PROCEDURE — 36415 COLL VENOUS BLD VENIPUNCTURE: CPT | Performed by: NURSE PRACTITIONER

## 2023-12-29 DIAGNOSIS — E07.9 THYROID DISEASE: Primary | ICD-10-CM

## 2023-12-29 LAB — TSH SERPL DL<=0.005 MIU/L-ACNC: 0.09 UIU/ML (ref 0.4–4.2)

## 2023-12-29 RX ORDER — LEVOTHYROXINE SODIUM 88 UG/1
88 TABLET ORAL DAILY
Qty: 30 TABLET | Refills: 11 | Status: SHIPPED | OUTPATIENT
Start: 2023-12-29 | End: 2024-12-28

## 2023-12-29 NOTE — RESULT ENCOUNTER NOTE
Please let Cyruskamala Alexander know that her TSH is now a bit too low. . I want her to go back to 88 mcg daily of levothyroxine and we will recheck in 6 weeks.

## 2024-01-16 ENCOUNTER — OFFICE VISIT (OUTPATIENT)
Age: 39
End: 2024-01-16
Payer: COMMERCIAL

## 2024-01-16 VITALS
WEIGHT: 112.4 LBS | BODY MASS INDEX: 18.06 KG/M2 | RESPIRATION RATE: 16 BRPM | HEART RATE: 79 BPM | SYSTOLIC BLOOD PRESSURE: 94 MMHG | DIASTOLIC BLOOD PRESSURE: 60 MMHG | HEIGHT: 66 IN

## 2024-01-16 DIAGNOSIS — E89.0 POSTSURGICAL HYPOTHYROIDISM: Primary | ICD-10-CM

## 2024-01-16 DIAGNOSIS — Z85.850 HISTORY OF THYROID CANCER: ICD-10-CM

## 2024-01-16 PROCEDURE — 99204 OFFICE O/P NEW MOD 45 MIN: CPT | Performed by: INTERNAL MEDICINE

## 2024-01-16 RX ORDER — CALCIUM CARBONATE 500 MG/1
1 TABLET, CHEWABLE ORAL AS NEEDED
COMMUNITY

## 2024-01-16 NOTE — PROGRESS NOTES
[facility] [place] [facility]  [facility] ENDOCRINOLOGY  [address]  [facility] [address]  Dept: [phone]  Loc: [phone]     Visit Date: 1/16/2024    Keeley Garrido is a 39 y.o. female who presents today for:  Chief Complaint   Patient presents with    New Patient        Subjective:      HPI     Keeley Garrido is a 39 y.o. , female who comes for Initial visit .  She was diagnosed with hypothyroidism 10 years.   Etiology of hypothyroidism is Surgery.   This full thyroidectomy was performed for papillary thyroid cancer in 2014. As far as the patient was aware, it was a singular tumor and as far as the patient is aware there was no lymph node or spread. She did not have any radioactive iodine therapy. She has a family history of thyroid cancer when she was 10.  Current therapy includes levothyroxine 88 mcg daily. She only takes TUMS/multivitamins as needed and she always waits 4 hours after synthroid before taking them. She also waits an hour after taking synthroid before consuming food, although she did not always do this with coffee.  Current symptoms include Cold intolerance, Dry Skin, and Brittle Hair, and are stable over the past few months. She has had hot flashes over the past few months due to menopause. Her weight has largely remained the same over the past few months. She has not had any depression or fatigue.  She denies any neck pain, trouble swallowing, or neck fullness.    Her last TSH in 12/28/23 was 0.086, and in 11/15/23 was 6.770. Her last Free T4 in   11/15/23 was 1.28.    Her \"standard\" synthroid dose is 88 mcg. She had it recently increased to 100 mcg but it sharply lowered her TSH so it was changed back to 88 mcg.    Past Medical History:   Diagnosis Date    Asthma     Cancer (HCC)     thyroid    Hyperlipidemia     Kidney stones     PONV (postoperative nausea and vomiting)     Thyroid disease       Past Surgical History:   Procedure Laterality Date

## 2024-01-16 NOTE — PATIENT INSTRUCTIONS
Continue your current levothyroxine dose  Get lab work and US done. We will call you and make further adjustments to synthroid once we have more information.  Follow-up in 3 months

## 2024-01-24 ENCOUNTER — HOSPITAL ENCOUNTER (OUTPATIENT)
Dept: ULTRASOUND IMAGING | Age: 39
Discharge: HOME | End: 2024-01-24
Payer: COMMERCIAL

## 2024-01-24 DIAGNOSIS — E89.0 POSTSURGICAL HYPOTHYROIDISM: ICD-10-CM

## 2024-01-24 DIAGNOSIS — Z85.850 HISTORY OF THYROID CANCER: ICD-10-CM

## 2024-01-24 PROCEDURE — 76536 US EXAM OF HEAD AND NECK: CPT

## 2024-01-25 ENCOUNTER — NURSE ONLY (OUTPATIENT)
Dept: FAMILY MEDICINE CLINIC | Age: 39
End: 2024-01-25
Payer: COMMERCIAL

## 2024-01-25 DIAGNOSIS — Z85.850 HISTORY OF THYROID CANCER: ICD-10-CM

## 2024-01-25 DIAGNOSIS — E89.0 POSTSURGICAL HYPOTHYROIDISM: ICD-10-CM

## 2024-01-25 LAB
T4 FREE SERPL-MCNC: 1.44 NG/DL (ref 0.93–1.76)
TSH SERPL DL<=0.005 MIU/L-ACNC: 0.28 UIU/ML (ref 0.4–4.2)

## 2024-01-25 PROCEDURE — 36415 COLL VENOUS BLD VENIPUNCTURE: CPT | Performed by: NURSE PRACTITIONER

## 2024-01-28 LAB — THYROGLOBULIN: NORMAL

## 2024-04-17 ENCOUNTER — HOSPITAL ENCOUNTER (OUTPATIENT)
Age: 39
Discharge: HOME | End: 2024-04-17
Payer: COMMERCIAL

## 2024-04-17 ENCOUNTER — OFFICE VISIT (OUTPATIENT)
Age: 39
End: 2024-04-17

## 2024-04-17 VITALS
HEIGHT: 66 IN | RESPIRATION RATE: 16 BRPM | BODY MASS INDEX: 18.74 KG/M2 | SYSTOLIC BLOOD PRESSURE: 88 MMHG | DIASTOLIC BLOOD PRESSURE: 64 MMHG | WEIGHT: 116.6 LBS | HEART RATE: 76 BPM

## 2024-04-17 DIAGNOSIS — Z85.850 HISTORY OF THYROID CANCER: ICD-10-CM

## 2024-04-17 DIAGNOSIS — Z85.850 HISTORY OF THYROID CANCER: Primary | ICD-10-CM

## 2024-04-17 DIAGNOSIS — E89.0 POSTSURGICAL HYPOTHYROIDISM: ICD-10-CM

## 2024-04-17 LAB
T4 FREE SERPL-MCNC: 1.46 NG/DL (ref 0.93–1.68)
TSH SERPL DL<=0.005 MIU/L-ACNC: 1.14 UIU/ML (ref 0.4–4.2)

## 2024-04-17 PROCEDURE — 84439 ASSAY OF FREE THYROXINE: CPT

## 2024-04-17 PROCEDURE — 36415 COLL VENOUS BLD VENIPUNCTURE: CPT

## 2024-04-17 PROCEDURE — 86800 THYROGLOBULIN ANTIBODY: CPT

## 2024-04-17 PROCEDURE — 84443 ASSAY THYROID STIM HORMONE: CPT

## 2024-04-17 NOTE — PROGRESS NOTES
[facility] [place] [facility]  [facility] ENDOCRINOLOGY  [address]. [address]  [facility] [address]  Dept: [phone]  Loc: [phone]     Visit Date: 4/17/2024    Keeley Garrido is a 39 y.o. female who presents today for:  Chief Complaint   Patient presents with    Follow-up     Postsurgical hypothyroidism             Subjective:      HPI     Keeley Garrido is a 39 y.o. , female who comes for Follow up for hypothyroidism and history of thyroid cancer  Windy Hunt, APRN - CNP  Keeley Garrido was diagnosed with hypothyroidism 10 year(s) ago.   Etiology of hypothyroidism is Surgery.   Current therapy includes levothyroxine 88 mcg daily .    Current symptoms include Cold intolerance, Fatigue, Dry Skin, and Brittle Hair.  Her labs in January showed appropriate suppression of TSH but free T4 was normal.  The patient had an ultrasound of the neck which showed no evidence of disease recurrence.  There were no abnormal lymph nodes.  She denies pain, choking and hoarseness of the voice.         Past Medical History:   Diagnosis Date    Asthma     Cancer (HCC)     thyroid    Hyperlipidemia     Kidney stones     PONV (postoperative nausea and vomiting)     Thyroid disease       Past Surgical History:   Procedure Laterality Date    CHOLECYSTECTOMY  02/2021    COLONOSCOPY      CYSTOSCOPY Right 5/12/2021    CYSTOSCOPY, RIGHT URETEROSCOPY performed by Chip Fountain MD at [facility] OR    EGD  2020    HYSTERECTOMY (CERVIX STATUS UNKNOWN)  2007    LITHOTRIPSY  2007    THYROIDECTOMY  2014       Family History   Problem Relation Age of Onset    High Blood Pressure Mother     Cancer Father         melanoma    Cancer Maternal Grandfather         lungs    Colon Cancer Neg Hx     Esophageal Cancer Neg Hx     Liver Cancer Neg Hx     Rectal Cancer Neg Hx     Stomach Cancer Neg Hx      Social History     Tobacco Use    Smoking status: Every Day     Current packs/day: 0.50     Average packs/day: 0.5

## 2024-04-18 LAB — THYROGLOBULIN: NORMAL

## 2024-04-28 ENCOUNTER — CLINICAL DOCUMENTATION (OUTPATIENT)
Age: 39
End: 2024-04-28

## 2024-04-28 DIAGNOSIS — Z85.850 HISTORY OF THYROID CANCER: Primary | ICD-10-CM

## 2024-05-02 ENCOUNTER — APPOINTMENT (OUTPATIENT)
Dept: CT IMAGING | Age: 39
End: 2024-05-02
Payer: COMMERCIAL

## 2024-05-02 ENCOUNTER — HOSPITAL ENCOUNTER (EMERGENCY)
Age: 39
Discharge: HOME | End: 2024-05-02
Payer: COMMERCIAL

## 2024-05-02 VITALS
OXYGEN SATURATION: 100 % | BODY MASS INDEX: 18.23 KG/M2 | WEIGHT: 113.4 LBS | HEART RATE: 75 BPM | TEMPERATURE: 98.5 F | DIASTOLIC BLOOD PRESSURE: 57 MMHG | SYSTOLIC BLOOD PRESSURE: 101 MMHG | RESPIRATION RATE: 16 BRPM | HEIGHT: 66 IN

## 2024-05-02 DIAGNOSIS — R10.84 GENERALIZED ABDOMINAL PAIN: Primary | ICD-10-CM

## 2024-05-02 LAB
ALBUMIN SERPL BCG-MCNC: 4.3 G/DL (ref 3.5–5.1)
ALP SERPL-CCNC: 73 U/L (ref 38–126)
ALT SERPL W/O P-5'-P-CCNC: 16 U/L (ref 11–66)
ANION GAP SERPL CALC-SCNC: 12 MEQ/L (ref 8–16)
AST SERPL-CCNC: 15 U/L (ref 5–40)
BASOPHILS ABSOLUTE: 0 THOU/MM3 (ref 0–0.1)
BASOPHILS NFR BLD AUTO: 0.3 %
BILIRUB CONJ SERPL-MCNC: < 0.2 MG/DL (ref 0–0.3)
BILIRUB SERPL-MCNC: 0.2 MG/DL (ref 0.3–1.2)
BUN SERPL-MCNC: 12 MG/DL (ref 7–22)
CALCIUM SERPL-MCNC: 9 MG/DL (ref 8.5–10.5)
CHLORIDE SERPL-SCNC: 103 MEQ/L (ref 98–111)
CO2 SERPL-SCNC: 25 MEQ/L (ref 23–33)
DEPRECATED RDW RBC AUTO: 43.4 FL (ref 35–45)
EOSINOPHIL NFR BLD AUTO: 1.6 %
EOSINOPHILS ABSOLUTE: 0.1 THOU/MM3 (ref 0–0.4)
ERYTHROCYTE [DISTWIDTH] IN BLOOD BY AUTOMATED COUNT: 12.5 % (ref 11.5–14.5)
GFR SERPL CREATININE-BSD FRML MDRD: > 90 ML/MIN/1.73M2
GLUCOSE SERPL-MCNC: 99 MG/DL (ref 70–108)
HCT VFR BLD AUTO: 41.2 % (ref 37–47)
HGB BLD-MCNC: 14.2 GM/DL (ref 12–16)
IMM GRANULOCYTES # BLD AUTO: 0.03 THOU/MM3 (ref 0–0.07)
IMM GRANULOCYTES NFR BLD AUTO: 0.4 %
LIPASE SERPL-CCNC: 42.7 U/L (ref 5.6–51.3)
LYMPHOCYTES ABSOLUTE: 3.1 THOU/MM3 (ref 1–4.8)
LYMPHOCYTES NFR BLD AUTO: 38.4 %
MCH RBC QN AUTO: 32.2 PG (ref 26–33)
MCHC RBC AUTO-ENTMCNC: 34.5 GM/DL (ref 32.2–35.5)
MCV RBC AUTO: 93.4 FL (ref 81–99)
MONOCYTES ABSOLUTE: 0.7 THOU/MM3 (ref 0.4–1.3)
MONOCYTES NFR BLD AUTO: 8.8 %
NEUTROPHILS ABSOLUTE: 4 THOU/MM3 (ref 1.8–7.7)
NEUTROPHILS NFR BLD AUTO: 50.5 %
NRBC BLD AUTO-RTO: 0 /100 WBC
OSMOLALITY SERPL CALC.SUM OF ELEC: 279.2 MOSMOL/KG (ref 275–300)
PLATELET # BLD AUTO: 195 THOU/MM3 (ref 130–400)
PMV BLD AUTO: 10.5 FL (ref 9.4–12.4)
POTASSIUM SERPL-SCNC: 3.8 MEQ/L (ref 3.5–5.2)
PROT SERPL-MCNC: 7 G/DL (ref 6.1–8)
RBC # BLD AUTO: 4.41 MILL/MM3 (ref 4.2–5.4)
SODIUM SERPL-SCNC: 140 MEQ/L (ref 135–145)
WBC # BLD AUTO: 8 THOU/MM3 (ref 4.8–10.8)

## 2024-05-02 PROCEDURE — 96375 TX/PRO/DX INJ NEW DRUG ADDON: CPT

## 2024-05-02 PROCEDURE — 99285 EMERGENCY DEPT VISIT HI MDM: CPT

## 2024-05-02 PROCEDURE — 83690 ASSAY OF LIPASE: CPT

## 2024-05-02 PROCEDURE — 36415 COLL VENOUS BLD VENIPUNCTURE: CPT

## 2024-05-02 PROCEDURE — 74177 CT ABD & PELVIS W/CONTRAST: CPT

## 2024-05-02 PROCEDURE — 80053 COMPREHEN METABOLIC PANEL: CPT

## 2024-05-02 PROCEDURE — 6360000004 HC RX CONTRAST MEDICATION: Performed by: PHYSICIAN ASSISTANT

## 2024-05-02 PROCEDURE — 85025 COMPLETE CBC W/AUTO DIFF WBC: CPT

## 2024-05-02 PROCEDURE — 96374 THER/PROPH/DIAG INJ IV PUSH: CPT

## 2024-05-02 PROCEDURE — 82248 BILIRUBIN DIRECT: CPT

## 2024-05-02 PROCEDURE — 6360000002 HC RX W HCPCS: Performed by: PHYSICIAN ASSISTANT

## 2024-05-02 RX ORDER — KETOROLAC TROMETHAMINE 30 MG/ML
15 INJECTION, SOLUTION INTRAMUSCULAR; INTRAVENOUS ONCE
Status: COMPLETED | OUTPATIENT
Start: 2024-05-02 | End: 2024-05-02

## 2024-05-02 RX ORDER — FENTANYL CITRATE 50 UG/ML
25 INJECTION, SOLUTION INTRAMUSCULAR; INTRAVENOUS ONCE
Status: COMPLETED | OUTPATIENT
Start: 2024-05-02 | End: 2024-05-02

## 2024-05-02 RX ADMIN — KETOROLAC TROMETHAMINE 15 MG: 30 INJECTION, SOLUTION INTRAMUSCULAR at 07:06

## 2024-05-02 RX ADMIN — FENTANYL CITRATE 25 MCG: 50 INJECTION INTRAMUSCULAR; INTRAVENOUS at 08:56

## 2024-05-02 RX ADMIN — IOPAMIDOL 80 ML: 755 INJECTION, SOLUTION INTRAVENOUS at 07:55

## 2024-05-02 ASSESSMENT — PAIN SCALES - GENERAL
PAINLEVEL_OUTOF10: 7
PAINLEVEL_OUTOF10: 8

## 2024-05-02 ASSESSMENT — PAIN - FUNCTIONAL ASSESSMENT
PAIN_FUNCTIONAL_ASSESSMENT: 0-10
PAIN_FUNCTIONAL_ASSESSMENT: 0-10

## 2024-05-02 ASSESSMENT — PAIN DESCRIPTION - LOCATION: LOCATION: ABDOMEN

## 2024-05-02 ASSESSMENT — PAIN DESCRIPTION - ORIENTATION: ORIENTATION: RIGHT;LOWER

## 2024-05-02 NOTE — ED TRIAGE NOTES
Pt presents to the ED for right lower abdominal pain for a couple of weeks. Pt denies taking medication for the pain. Pt denies difficulty urinating or having a bowel movement.

## 2024-05-02 NOTE — DISCHARGE INSTR - COC
Continuity of Care Form    Patient Name: Keeley Garrido   :  1985  MRN:  [id number]    Admit date:  2024  Discharge date:  ***    Code Status Order: No Order   Advance Directives:     Admitting Physician:  No admitting provider for patient encounter.  PCP: Windy Hunt, SILVIO - CNP    Discharging Nurse: ***  Discharging Hospital Unit/Room#: 23/023A  Discharging Unit Phone Number: ***    Emergency Contact:   Extended Emergency Contact Information  Primary Emergency Contact: Neri Garrido  Home Phone: [phone]  Relation: Spouse    Past Surgical History:  Past Surgical History:   Procedure Laterality Date    CHOLECYSTECTOMY  2021    COLONOSCOPY      CYSTOSCOPY Right 2021    CYSTOSCOPY, RIGHT URETEROSCOPY performed [facility] MD [facility] OR    [facility]      HYSTERECTOMY (CERVIX STATUS UNKNOWN)      LITHOTRIPSY      THYROIDECTOMY         Immunization History:     There is no immunization history on file for this patient.    Active Problems:  Patient Active Problem List   Diagnosis Code    Malignant tumor of thyroid gland (HCC) C73    Kidney stones N20.0    Chronic pain G89.29    Asthma J45.909    Hyperlipidemia E78.5    Thyroid disease E07.9    Hx of thyroidectomy E89.0       Isolation/Infection:   Isolation            No Isolation          Patient Infection Status       None to display                     Nurse Assessment:  Last Vital Signs: BP (!) 101/57   Pulse 75   Temp 98.5 °F (36.9 °C) (Oral)   Resp 16   Ht 1.676 m (5' 6\")   Wt 51.4 kg (113 lb 6.4 oz)   SpO2 100%   BMI 18.30 kg/m²     Last documented pain score (0-10 scale): Pain Level: 7  Last Weight:   Wt Readings from Last 1 Encounters:   24 51.4 kg (113 lb 6.4 oz)     Mental Status:  {IP PT MENTAL STATUS:38942}    IV Access:  { ENZO IV ACCESS:753562284}    Nursing Mobility/ADLs:  Walking   {CHP DME ADLs:293961063}  Transfer  {CHP DME ADLs:293272296}  Bathing  {CHP DME ADLs:074354493}  Dressing  {CHP

## 2024-05-02 NOTE — ED PROVIDER NOTES
[facility] EMERGENCY DEPT  EMERGENCY DEPARTMENT ENCOUNTER      Pt Name: Keeley Garrido  MRN: [id number]  Birthdate 1985  Date of evaluation: 5/2/2024  Provider: Brandyn Skinner PA-C    CHIEF COMPLAINT     Chief Complaint   Patient presents with    Abdominal Pain     Right lower       HISTORY OF PRESENT ILLNESS    Keeley Garrido is a 39 y.o. female who presents to the emergency department with complaints of abdominal pain for approximate 1 week.  Just on the right side of her abdomen.  Feels like a pulling type pain.  Patient is a much worse this morning.  Denies any nausea or vomiting.  Denies diarrhea.  Last bowel movement was today which was normal.  Denies any fevers or chills.  Denies hematuria or dysuria.  Patient a lot of menstruation she had a hysterectomy.      Triage notes and Nursing notes were reviewed by myself.  Any discrepancies are addressed above.    PAST MEDICAL HISTORY     Past Medical History:   Diagnosis Date    Asthma     Cancer (HCC)     thyroid    Hyperlipidemia     Kidney stones     PONV (postoperative nausea and vomiting)     Thyroid disease        SURGICAL HISTORY       Past Surgical History:   Procedure Laterality Date    CHOLECYSTECTOMY  02/2021    COLONOSCOPY      CYSTOSCOPY Right 5/12/2021    CYSTOSCOPY, RIGHT [facility]RETEROSCOPY [facility]    EGD  2020    HYSTERECTOMY (CERVIX STATUS UNKNOWN)  2007    LITHOTRIPSY  2007    THYROIDECTOMY  2014       CURRENT MEDICATIONS       Previous Medications    ALBUTEROL SULFATE HFA (VENTOLIN HFA) 108 (90 BASE) MCG/ACT INHALER    Inhale 2 puffs into the lungs every 4 hours as needed for Wheezing    CALCIUM CARBONATE (TUMS) 500 MG CHEWABLE TABLET    Take 1 tablet by mouth as needed for Heartburn    IBUPROFEN (ADVIL;MOTRIN) 800 MG TABLET    Take 1 tablet by mouth every 6 hours as needed for Pain    LEVOTHYROXINE (SYNTHROID) 88 MCG TABLET    Take 1 tablet by mouth Daily    MULTIPLE VITAMINS-MINERALS (WOMENS ONE DAILY) TABS

## 2024-05-03 ENCOUNTER — TELEPHONE (OUTPATIENT)
Dept: FAMILY MEDICINE CLINIC | Age: 39
End: 2024-05-03

## 2024-05-07 ENCOUNTER — OFFICE VISIT (OUTPATIENT)
Dept: FAMILY MEDICINE CLINIC | Age: 39
End: 2024-05-07
Payer: COMMERCIAL

## 2024-05-07 VITALS
SYSTOLIC BLOOD PRESSURE: 122 MMHG | HEIGHT: 66 IN | WEIGHT: 113 LBS | RESPIRATION RATE: 16 BRPM | OXYGEN SATURATION: 97 % | BODY MASS INDEX: 18.16 KG/M2 | HEART RATE: 84 BPM | DIASTOLIC BLOOD PRESSURE: 64 MMHG

## 2024-05-07 DIAGNOSIS — C73 MALIGNANT TUMOR OF THYROID GLAND (HCC): ICD-10-CM

## 2024-05-07 DIAGNOSIS — G89.29 CHRONIC ABDOMINAL PAIN: ICD-10-CM

## 2024-05-07 DIAGNOSIS — R10.9 CHRONIC ABDOMINAL PAIN: ICD-10-CM

## 2024-05-07 DIAGNOSIS — R10.9 ABDOMINAL PAIN, UNSPECIFIED ABDOMINAL LOCATION: Primary | ICD-10-CM

## 2024-05-07 LAB
BILIRUBIN, POC: NORMAL
BLOOD URINE, POC: NORMAL
CLARITY, POC: CLEAR
COLOR, POC: YELLOW
GLUCOSE URINE, POC: NEGATIVE
KETONES, POC: NORMAL
LEUKOCYTE EST, POC: NEGATIVE
NITRITE, POC: NEGATIVE
PH, POC: 6
PROTEIN, POC: 30
SPECIFIC GRAVITY, POC: 1.02
UROBILINOGEN, POC: 1

## 2024-05-07 PROCEDURE — 81003 URINALYSIS AUTO W/O SCOPE: CPT | Performed by: NURSE PRACTITIONER

## 2024-05-07 PROCEDURE — 99214 OFFICE O/P EST MOD 30 MIN: CPT | Performed by: NURSE PRACTITIONER

## 2024-05-07 SDOH — ECONOMIC STABILITY: FOOD INSECURITY: WITHIN THE PAST 12 MONTHS, THE FOOD YOU BOUGHT JUST DIDN'T LAST AND YOU DIDN'T HAVE MONEY TO GET MORE.: NEVER TRUE

## 2024-05-07 SDOH — ECONOMIC STABILITY: INCOME INSECURITY: HOW HARD IS IT FOR YOU TO PAY FOR THE VERY BASICS LIKE FOOD, HOUSING, MEDICAL CARE, AND HEATING?: NOT HARD AT ALL

## 2024-05-07 SDOH — ECONOMIC STABILITY: FOOD INSECURITY: WITHIN THE PAST 12 MONTHS, YOU WORRIED THAT YOUR FOOD WOULD RUN OUT BEFORE YOU GOT MONEY TO BUY MORE.: NEVER TRUE

## 2024-05-07 ASSESSMENT — PATIENT HEALTH QUESTIONNAIRE - PHQ9
2. FEELING DOWN, DEPRESSED OR HOPELESS: NOT AT ALL
SUM OF ALL RESPONSES TO PHQ QUESTIONS 1-9: 0
SUM OF ALL RESPONSES TO PHQ QUESTIONS 1-9: 0
1. LITTLE INTEREST OR PLEASURE IN DOING THINGS: NOT AT ALL
SUM OF ALL RESPONSES TO PHQ QUESTIONS 1-9: 0
SUM OF ALL RESPONSES TO PHQ QUESTIONS 1-9: 0
SUM OF ALL RESPONSES TO PHQ9 QUESTIONS 1 & 2: 0

## 2024-05-07 NOTE — PROGRESS NOTES
[facility]  [address] [address]  Dept: [phone]  Loc: [phone]    Keeley Garrido (:  1985) is a 39 y.o. female,Established patient, here for evaluation of the following chief complaint(s):  ED Follow-up (Abdominal pain. Still having stomach pains, was prescribed etodolac but has not started because she has not picked it up. )      ASSESSMENT/PLAN:  1. Abdominal pain, unspecified abdominal location  Pt non toxic appearing and in no acute distress.    Patient following up she was in the emergency room on May 2 for generalized abdominal pain, she did have a CT completed which showed probable small bilateral ovarian cyst but no acute findings.  She does have a history of cholecystectomy and hysterectomy and she reports she is wondering if her pain is from previous surgeries may be adhesions.  Urinalysis completed today and vaginal pathogen swab, as she does have a history of bacterial vaginosis which could possibly be a cause of this abdominal pain as well.  Will follow once results are received  UA in office unremarkable.  Patient does have some generalized abdominal tenderness but no concern for an acute abdomen.  -     POCT Urinalysis No Micro (Auto)  -     Vaginal Pathogens DNA Panel; Future  2. Malignant tumor of thyroid gland (HCC)  Patient with a history of thyroid cancer did have her thyroid removed.  She is currently taking levothyroxine 88 mcg daily.  3. Chronic abdominal pain  -     External Referral To General Surgery  With patient's chronic abdominal pain and history of abdominal surgeries she had requested referral be placed to a general surgeon she has seen in the past.  Chronic abdominal pain.       Jose Luis Penny MD  General Surgery, Trauma        IMPRESSION:     1. Stable CT scan of the abdomen and pelvis, no interval change since previous study dated 2023.  2. Status post cholecystectomy and

## 2024-05-07 NOTE — PROGRESS NOTES
Health Maintenance Due   Topic Date Due    Hepatitis B vaccine (1 of 3 - 3-dose series) Never done    COVID-19 Vaccine (1) Never done    Pneumococcal 0-64 years Vaccine (1 of 2 - PCV) Never done    DTaP/Tdap/Td vaccine (1 - Tdap) Never done    Depression Screen  05/05/2024

## 2024-05-09 ENCOUNTER — TELEPHONE (OUTPATIENT)
Dept: FAMILY MEDICINE CLINIC | Age: 39
End: 2024-05-09

## 2024-05-09 DIAGNOSIS — G89.29 CHRONIC ABDOMINAL PAIN: Primary | ICD-10-CM

## 2024-05-09 DIAGNOSIS — R10.9 CHRONIC ABDOMINAL PAIN: Primary | ICD-10-CM

## 2024-05-09 NOTE — TELEPHONE ENCOUNTER
Please check with Dr. Addis Quiroz office with [facility] and see if they would see her .. I am thinking she has some abdominal adhesions, and wondering if an Exploratory lap would be appropriate and if she would eval.

## 2024-05-09 NOTE — TELEPHONE ENCOUNTER
[facility] calling stating they do not see people just for abdominal pain. I informed her about the CT scan but since it was normal they won't see her.

## 2024-05-10 NOTE — TELEPHONE ENCOUNTER
Office will see pt Tuesday 5/14 @ 9:30am, please place referral       Left message for pt to call office back

## 2024-05-13 ASSESSMENT — ENCOUNTER SYMPTOMS: ABDOMINAL PAIN: 1

## 2024-05-14 ENCOUNTER — TELEPHONE (OUTPATIENT)
Dept: SURGERY | Age: 39
End: 2024-05-14

## 2024-05-14 ENCOUNTER — OFFICE VISIT (OUTPATIENT)
Dept: SURGERY | Age: 39
End: 2024-05-14
Payer: COMMERCIAL

## 2024-05-14 VITALS
BODY MASS INDEX: 18.53 KG/M2 | HEIGHT: 66 IN | WEIGHT: 115.3 LBS | TEMPERATURE: 98.3 F | SYSTOLIC BLOOD PRESSURE: 98 MMHG | OXYGEN SATURATION: 97 % | DIASTOLIC BLOOD PRESSURE: 56 MMHG | HEART RATE: 68 BPM

## 2024-05-14 DIAGNOSIS — N83.202 BILATERAL OVARIAN CYSTS: ICD-10-CM

## 2024-05-14 DIAGNOSIS — N83.201 BILATERAL OVARIAN CYSTS: ICD-10-CM

## 2024-05-14 DIAGNOSIS — R10.31 RIGHT LOWER QUADRANT ABDOMINAL PAIN: Primary | ICD-10-CM

## 2024-05-14 PROCEDURE — 99204 OFFICE O/P NEW MOD 45 MIN: CPT | Performed by: SURGERY

## 2024-05-14 RX ORDER — IBUPROFEN 200 MG
200 TABLET ORAL EVERY 6 HOURS PRN
COMMUNITY

## 2024-05-14 ASSESSMENT — ENCOUNTER SYMPTOMS
ABDOMINAL PAIN: 1
BACK PAIN: 1

## 2024-05-14 NOTE — PROGRESS NOTES
Pupils are equal, round, and reactive to light.   Neck:      Thyroid: No thyromegaly.      Trachea: No tracheal deviation.   Cardiovascular:      Rate and Rhythm: Normal rate.      Pulses: Normal pulses.   Pulmonary:      Effort: Pulmonary effort is normal. No respiratory distress.   Abdominal:      Palpations: Abdomen is soft.      Tenderness: There is abdominal tenderness (Right mid abdomen). There is no guarding.      Hernia: No hernia is present.   Musculoskeletal:         General: No deformity. Normal range of motion.      Cervical back: Normal range of motion and neck supple.   Skin:     General: Skin is warm.      Findings: No rash.   Neurological:      General: No focal deficit present.      Mental Status: She is alert and oriented to person, place, and time.   Psychiatric:         Mood and Affect: Mood normal.         Speech: Speech normal.         Behavior: Behavior normal.         Thought Content: Thought content normal.         LABS:   No results for input(s): \"WBC\", \"HGB\", \"HCT\", \"PLT\", \"NA\", \"K\", \"CL\", \"CO2\", \"BUN\", \"CREATININE\", \"MG\", \"PHOS\", \"CALCIUM\", \"INR\", \"AST\", \"ALT\", \"BILITOT\", \"BILIDIR\", \"AMYLASE\", \"LIPASE\", \"LDH\", \"LACTA\", \"NITRU\", \"COLORU\", \"BACTERIA\" in the last 72 hours.    Invalid input(s): \"PT\", \"PTT\", \"WBCU\", \"RBCU\", \"LEUKOCYTESUA\"  RADIOLOGY:   Narrative & Impression  PROCEDURE: CT ABDOMEN PELVIS W IV CONTRAST     CLINICAL INFORMATION: abdominal pain .     COMPARISON: CT scan of the abdomen and pelvis dated 11/27/2023.     TECHNIQUE: Axial 5 mm CT images were obtained through the abdomen and pelvis after the administration of intravenous contrast. Coronal and sagittal reconstructions were obtained.     All CT scans at this facility use dose modulation, iterative reconstruction, and/or weight-based dosing when appropriate to reduce radiation dose to as low as reasonably achievable.     FINDINGS:        The visualized aspects of the lung bases are clear.   The base of the heart is within

## 2024-05-14 NOTE — TELEPHONE ENCOUNTER
Referral placed and records faxed to [facility].  Patient aware and was advised the office will call her with an appointment.

## 2024-06-11 ENCOUNTER — TELEPHONE (OUTPATIENT)
Dept: SURGERY | Age: 39
End: 2024-06-11

## 2024-06-11 ENCOUNTER — NURSE ONLY (OUTPATIENT)
Dept: FAMILY MEDICINE CLINIC | Age: 39
End: 2024-06-11
Payer: COMMERCIAL

## 2024-06-11 DIAGNOSIS — Z85.850 HISTORY OF THYROID CANCER: ICD-10-CM

## 2024-06-11 PROCEDURE — 36415 COLL VENOUS BLD VENIPUNCTURE: CPT | Performed by: NURSE PRACTITIONER

## 2024-06-11 NOTE — PROGRESS NOTES
1200-Pt having more episodes of emesis, olive green fluid. Reglan/Zofran IV previously given, no relief. MD Gleason notified,     1230- MD Barajas and Victorino at bedside    1630-Post phenergan suppository, pt still nauseous but better than before.    1730-Asking for zofran, zofran given. Will continue to monitor     1900- pt still having episodes of olive green emesis. MD notified Victorino   1910-MD Gleason at bedside   Venipuncture obtained from right arm. Patient tolerated the procedure without complications or complaints.

## 2024-06-12 LAB
T4 FREE SERPL-MCNC: 1.62 NG/DL (ref 0.93–1.68)
TSH SERPL DL<=0.005 MIU/L-ACNC: 0.2 UIU/ML (ref 0.4–4.2)

## 2024-06-16 ENCOUNTER — CLINICAL DOCUMENTATION (OUTPATIENT)
Age: 39
End: 2024-06-16

## 2024-06-17 ENCOUNTER — TELEPHONE (OUTPATIENT)
Age: 39
End: 2024-06-17

## 2024-06-17 NOTE — TELEPHONE ENCOUNTER
----- Message from Ventura Hackett MD sent at 6/16/2024  1:56 PM EDT -----  Labs reviewed.  Keep existing appointment to discuss thyroid labs.

## 2024-07-18 ENCOUNTER — OFFICE VISIT (OUTPATIENT)
Age: 39
End: 2024-07-18
Payer: COMMERCIAL

## 2024-07-18 VITALS
DIASTOLIC BLOOD PRESSURE: 60 MMHG | OXYGEN SATURATION: 95 % | SYSTOLIC BLOOD PRESSURE: 96 MMHG | HEART RATE: 99 BPM | WEIGHT: 117.2 LBS | BODY MASS INDEX: 18.93 KG/M2 | RESPIRATION RATE: 18 BRPM

## 2024-07-18 DIAGNOSIS — Z85.850 HISTORY OF THYROID CANCER: ICD-10-CM

## 2024-07-18 DIAGNOSIS — E89.0 POSTSURGICAL HYPOTHYROIDISM: Primary | ICD-10-CM

## 2024-07-18 PROCEDURE — 99214 OFFICE O/P EST MOD 30 MIN: CPT | Performed by: INTERNAL MEDICINE

## 2024-07-18 NOTE — PROGRESS NOTES
[facility] [place] [facility]  [facility] ENDOCRINOLOGY  [address]  [place] [address]  Dept: [phone]  Loc: [phone]     Visit Date: 7/18/2024    Keeley Garrido is a 39 y.o. female who presents today for:  Chief Complaint   Patient presents with    Thyroidectomy     3 month follow up          Subjective:      HPI     Keeley Garrido is a 39 y.o. , female who comes for Follow up for hypothyroidism.  She was diagnosed with hypothyroidism more than 10 years ago.   Etiology of hypothyroidism is Surgery. Current therapy includes Levothyroxine 88 mcg.  Current symptoms include Cold intolerance, Dry Skin, and Brittle Hair however this has not worsened.   Last labs 6/11/2024 TSH .196, previous on 4/17/2024 1.140. 6/11/2024 Free T4 1.62. Last US thyroid 1/24/2024 S/P post total thyroidectomy, No residual thyroid tissue or mass within either thyroid bed. No pathological enlarged lymph nodes adjacent to thyroid bed. She denies any trouble swallowing, chocking. Pain, hoarseness of the voice.        Past Medical History:   Diagnosis Date    Asthma     Cancer (HCC)     thyroid    Hyperlipidemia     Kidney stones     PONV (postoperative nausea and vomiting)     Thyroid disease       Past Surgical History:   Procedure Laterality Date    CHOLECYSTECTOMY  02/22/2021    [facility] Dr. Penny    COLONOSCOPY  12/18/2020    Dr. Thompson    CYSTOSCOPY Right 05/12/2021    CYSTOSCOPY, RIGHT URETEROSCOPY performed by Chip Fountain MD at [facility] OR    EGD  11/30/2020    Dr. Thompson    HYSTERECTOMY (CERVIX STATUS UNKNOWN)  2007    LITHOTRIPSY  2007    THYROIDECTOMY  2014       Family History   Problem Relation Age of Onset    High Blood Pressure Mother     Cancer Father         melanoma    Cancer Maternal Grandfather         lungs    Colon Cancer Neg Hx     Esophageal Cancer Neg Hx     Liver Cancer Neg Hx     Rectal Cancer Neg Hx     Stomach Cancer Neg Hx      Social History     Tobacco Use    Smoking status:

## 2024-08-05 ENCOUNTER — HOSPITAL ENCOUNTER (OUTPATIENT)
Dept: PHYSICAL THERAPY | Age: 39
Setting detail: THERAPIES SERIES
Discharge: HOME | End: 2024-08-05
Payer: COMMERCIAL

## 2024-08-05 PROCEDURE — 97112 NEUROMUSCULAR REEDUCATION: CPT

## 2024-08-05 PROCEDURE — 97535 SELF CARE MNGMENT TRAINING: CPT

## 2024-08-05 PROCEDURE — 97166 OT EVAL MOD COMPLEX 45 MIN: CPT

## 2024-08-05 PROCEDURE — 97530 THERAPEUTIC ACTIVITIES: CPT

## 2024-08-05 NOTE — THERAPY EVALUATION
** PLEASE SIGN, DATE AND TIME CERTIFICATION BELOW AND RETURN TO [facility] OUTPATIENT REHABILITATION (FAX #: [phone]).  ATTEST/CO-SIGN IF ACCESSING VIA [employer].  THANK YOU.**    I certify that I have examined the patient below and determined that Physical Medicine and Rehabilitation service is necessary and that I approve the established plan of care for up to 90 days or as specifically noted.  Attestation, signature or co-signature of physician indicates approval of certification requirements.    ________________________ ____________ __________  Physician Signature   Date   Time  [facility]  OCCUPATIONAL THERAPY  OUTPATIENT REHABILITATION - SPECIALIZED THERAPY SERVICES  [x] PELVIC HEALTH EVALUATION  [] DAILY NOTE  [] PROGRESS NOTE [] DISCHARGE NOTE    Date: 2024  Patient Name:  Keeley Garrido  : 1985  MRN: [id number]  CSN: [id number]    Referring Practitioner Sheila Ortiz, DO    Diagnosis Pelvic and perineal pain   Treatment Diagnosis M62.81 - Muscle Weakness (Generalized), R35.0 - Frequency of Micturition  R39.14 - Feeling of Incomplete Bladder Emptying  N81.10 - Cystocele, Unspecified, K59.09 - Other Constipation  K62.3 - Rectal Prolapse  R19.7 - Diarrhea, Unspecified, and R10.30 - Lower Abdominal Pain, Unspecified  N94.10 - Unspecified Dyspareunia  L90.5 - Scar Conditions and Fibrosis of Skin   Date of Evaluation 24    Additional Pertinent History Keeley Garrido has a past medical history of Asthma, Cancer (HCC), Hyperlipidemia, Kidney stones, PONV (postoperative nausea and vomiting), and Thyroid disease.    Keeley Garrido has a past surgical history that includes Hysterectomy (); Lithotripsy (); Thyroidectomy (); EGD (2020); Colonoscopy (2020); Cystoscopy (Right, 2021); and Cholecystectomy (2021).      Functional Outcome Measure Used [facility]   Functional Outcome Score 17 (24)       Insurance: Primary: Payor: [facility]

## 2024-08-13 ENCOUNTER — HOSPITAL ENCOUNTER (OUTPATIENT)
Dept: PHYSICAL THERAPY | Age: 39
Setting detail: THERAPIES SERIES
Discharge: HOME | End: 2024-08-13
Payer: COMMERCIAL

## 2024-08-13 PROCEDURE — 97140 MANUAL THERAPY 1/> REGIONS: CPT

## 2024-08-13 PROCEDURE — 97530 THERAPEUTIC ACTIVITIES: CPT

## 2024-08-13 NOTE — PROGRESS NOTES
increased  satisfaction within relationship and/or allow medical examinations to ensure pt health.   This pt will report pelvic pain decreased to 2/10 at worst to increase tolerance to work and home tasks.  This pt will demonstrate PPUF score change by 5 points in order to indication functional improvement with therapy.    This pt will demonstrate independence with advanced HEP in order to maintain gains made in therapy for reduced need for ongoing or additional medical assessment.     Pt will report improved bladder emptying by 50% to decrease anterior wall prolapse strain.   This pt will improve core strength to 4+/5 in order to increase visceral support and reduce PFM strain and pain with functional tasks and/or intercourse.    PFM strength WNL to increase visceral support and reduce risk of symptoms reoccurring.    Deland Score improved to 4-5 to indicate functional improvement with therapy.    This pt will report improved defecation ease and frequency by 50% in order to promote overall pt health.    The pt will demo PFM strength to  3+/8/10/10 in order to promote bowel alignment and increased ease of defecation.      PLAN:  Treatment Recommendations: Strengthening, Functional Mobility Training, Endurance Training, Neuromuscular Re-education, Manual Therapy - Soft Tissue Mobilization, Manual Therapy - Joint Manipulation, Pain Management, Home Exercise Program, Self-Care Education and Training, Positioning, and Modalities    [x]  Plan of care initiated.  Plan to see patient weekly with plans to taper to biweekly for 14 visits to address the treatment planned outlined above.  []  Continue with current plan of care  []  Modify plan of care as follows:    []  Hold pending physician visit  []  Discharge    Time In 1000   Time Out 1100   Timed Code Minutes: 60 min   Total Treatment Time: 60 min   VANDANA Rooney, OTR/L  Lic #: [id number]      Electronically Signed by: Steph Sales OT

## 2024-08-20 ENCOUNTER — APPOINTMENT (OUTPATIENT)
Dept: PHYSICAL THERAPY | Age: 39
End: 2024-08-20
Payer: COMMERCIAL

## 2024-08-27 ENCOUNTER — HOSPITAL ENCOUNTER (OUTPATIENT)
Dept: PHYSICAL THERAPY | Age: 39
Setting detail: THERAPIES SERIES
Discharge: HOME | End: 2024-08-27
Payer: COMMERCIAL

## 2024-08-27 PROCEDURE — 97140 MANUAL THERAPY 1/> REGIONS: CPT

## 2024-08-27 PROCEDURE — 97535 SELF CARE MNGMENT TRAINING: CPT

## 2024-08-27 NOTE — PROGRESS NOTES
** PLEASE SIGN, DATE AND TIME CERTIFICATION BELOW AND RETURN TO [facility] OUTPATIENT REHABILITATION (FAX #: [phone]).  ATTEST/CO-SIGN IF ACCESSING VIA [employer]  THANK YOU.**    I certify that I have examined the patient below and determined that Physical Medicine and Rehabilitation service is necessary and that I approve the established plan of care for up to 90 days or as specifically noted.  Attestation, signature or co-signature of physician indicates approval of certification requirements.    ________________________ ____________ __________  Physician Signature   Date   Time  [facility]  OCCUPATIONAL THERAPY  OUTPATIENT REHABILITATION - SPECIALIZED THERAPY SERVICES  [] PELVIC HEALTH EVALUATION  [x] DAILY NOTE  [] PROGRESS NOTE [] DISCHARGE NOTE    Date: 2024  Patient Name:  Keeley Garrido  : 1985  MRN: [id number]  [facility]: [id number]    Referring Practitioner     Diagnosis     Treatment Diagnosis M62.81 - Muscle Weakness (Generalized), R35.0 - Frequency of Micturition  R39.14 - Feeling of Incomplete Bladder Emptying  N81.10 - Cystocele, Unspecified, K59.09 - Other Constipation  K62.3 - Rectal Prolapse  R19.7 - Diarrhea, Unspecified, and R10.30 - Lower Abdominal Pain, Unspecified  N94.10 - Unspecified Dyspareunia  L90.5 - Scar Conditions and Fibrosis of Skin   Date of Evaluation 24    Additional Pertinent History Keeley Garrido has a past medical history of Asthma, Cancer (HCC), Hyperlipidemia, Kidney stones, PONV (postoperative nausea and vomiting), and Thyroid disease.    Keeley Garrido has a past surgical history that includes Hysterectomy (); Lithotripsy (); Thyroidectomy (); EGD (2020); Colonoscopy (2020); Cystoscopy (Right, 2021); and Cholecystectomy (2021).      Functional Outcome Measure Used [facility]   Functional Outcome Score 17 (24)       Insurance: Primary: Payor: [facility] /  /  / ,   Secondary:    Authorization

## 2024-08-28 ENCOUNTER — APPOINTMENT (OUTPATIENT)
Dept: PHYSICAL THERAPY | Age: 39
End: 2024-08-28
Payer: COMMERCIAL

## 2024-09-04 ENCOUNTER — HOSPITAL ENCOUNTER (OUTPATIENT)
Dept: PHYSICAL THERAPY | Age: 39
Setting detail: THERAPIES SERIES
Discharge: HOME | End: 2024-09-04
Payer: COMMERCIAL

## 2024-09-04 ENCOUNTER — LAB (OUTPATIENT)
Dept: FAMILY MEDICINE CLINIC | Age: 39
End: 2024-09-04
Payer: COMMERCIAL

## 2024-09-04 DIAGNOSIS — Z85.850 HISTORY OF THYROID CANCER: ICD-10-CM

## 2024-09-04 DIAGNOSIS — E89.0 POSTSURGICAL HYPOTHYROIDISM: ICD-10-CM

## 2024-09-04 PROCEDURE — 97140 MANUAL THERAPY 1/> REGIONS: CPT

## 2024-09-04 PROCEDURE — 97535 SELF CARE MNGMENT TRAINING: CPT

## 2024-09-04 PROCEDURE — 36415 COLL VENOUS BLD VENIPUNCTURE: CPT | Performed by: NURSE PRACTITIONER

## 2024-09-04 NOTE — PROGRESS NOTES
** PLEASE SIGN, DATE AND TIME CERTIFICATION BELOW AND RETURN TO [facility] OUTPATIENT REHABILITATION (FAX #: [phone]).  ATTEST/CO-SIGN IF ACCESSING VIA [employer].  THANK YOU.**    I certify that I have examined the patient below and determined that Physical Medicine and Rehabilitation service is necessary and that I approve the established plan of care for up to 90 days or as specifically noted.  Attestation, signature or co-signature of physician indicates approval of certification requirements.    ________________________ ____________ __________  Physician Signature   Date   Time  [facility]  OCCUPATIONAL THERAPY  OUTPATIENT REHABILITATION - SPECIALIZED THERAPY SERVICES  [] PELVIC HEALTH EVALUATION  [x] DAILY NOTE  [] PROGRESS NOTE [] DISCHARGE NOTE    Date: 2024  Patient Name:  Keeley Garrido  : 1985  MRN: [id number]  CSN: [id number]    Referring Practitioner Sheila Ortiz, DO    Diagnosis Pelvic and perineal pain   Treatment Diagnosis M62.81 - Muscle Weakness (Generalized), R35.0 - Frequency of Micturition  R39.14 - Feeling of Incomplete Bladder Emptying  N81.10 - Cystocele, Unspecified, K59.09 - Other Constipation  K62.3 - Rectal Prolapse  R19.7 - Diarrhea, Unspecified, and R10.30 - Lower Abdominal Pain, Unspecified  N94.10 - Unspecified Dyspareunia  L90.5 - Scar Conditions and Fibrosis of Skin   Date of Evaluation 24    Additional Pertinent History Keeley Garrido has a past medical history of Asthma, Cancer (HCC), Hyperlipidemia, Kidney stones, PONV (postoperative nausea and vomiting), and Thyroid disease.    Keeley Garrido has a past surgical history that includes Hysterectomy (); Lithotripsy (); Thyroidectomy (); EGD (2020); Colonoscopy (2020); Cystoscopy (Right, 2021); and Cholecystectomy (2021).      Functional Outcome Measure Used [facility]   Functional Outcome Score 17 (24)       Insurance: Primary: Payor: [facility]

## 2024-09-05 LAB
T4 FREE SERPL-MCNC: 1.4 NG/DL (ref 0.93–1.68)
TSH SERPL DL<=0.005 MIU/L-ACNC: 0.46 UIU/ML (ref 0.4–4.2)

## 2024-09-10 ENCOUNTER — HOSPITAL ENCOUNTER (OUTPATIENT)
Dept: PHYSICAL THERAPY | Age: 39
Setting detail: THERAPIES SERIES
Discharge: HOME | End: 2024-09-10
Payer: COMMERCIAL

## 2024-09-10 PROCEDURE — 97112 NEUROMUSCULAR REEDUCATION: CPT

## 2024-09-10 PROCEDURE — 97535 SELF CARE MNGMENT TRAINING: CPT

## 2024-09-10 PROCEDURE — 97530 THERAPEUTIC ACTIVITIES: CPT

## 2024-09-25 ENCOUNTER — HOSPITAL ENCOUNTER (OUTPATIENT)
Dept: PHYSICAL THERAPY | Age: 39
Setting detail: THERAPIES SERIES
Discharge: HOME | End: 2024-09-25
Payer: COMMERCIAL

## 2024-09-25 PROCEDURE — 97140 MANUAL THERAPY 1/> REGIONS: CPT

## 2024-09-25 PROCEDURE — 97535 SELF CARE MNGMENT TRAINING: CPT

## 2024-10-08 ENCOUNTER — HOSPITAL ENCOUNTER (OUTPATIENT)
Dept: PHYSICAL THERAPY | Age: 39
Setting detail: THERAPIES SERIES
Discharge: HOME | End: 2024-10-08
Payer: COMMERCIAL

## 2024-10-08 PROCEDURE — 97140 MANUAL THERAPY 1/> REGIONS: CPT

## 2024-10-08 PROCEDURE — 97530 THERAPEUTIC ACTIVITIES: CPT

## 2024-10-08 NOTE — PROGRESS NOTES
** PLEASE SIGN, DATE AND TIME CERTIFICATION BELOW AND RETURN TO [facility] OUTPATIENT REHABILITATION (FAX #: [phone]).  ATTEST/CO-SIGN IF ACCESSING VIA [employer].  THANK YOU.**    I certify that I have examined the patient below and determined that Physical Medicine and Rehabilitation service is necessary and that I approve the established plan of care for up to 90 days or as specifically noted.  Attestation, signature or co-signature of physician indicates approval of certification requirements.    ________________________ ____________ __________  Physician Signature   Date   Time  [facility]  OCCUPATIONAL THERAPY  OUTPATIENT REHABILITATION - SPECIALIZED THERAPY SERVICES  [] PELVIC HEALTH EVALUATION  [x] DAILY NOTE  [] PROGRESS NOTE [] DISCHARGE NOTE    Date: 10/8/2024  Patient Name:  Keeley Garrido  : 1985  MRN: [id number]  [facility]: [id number]    Referring Practitioner Sheila Ortiz DO    Diagnosis Pelvic and perineal pain   Treatment Diagnosis M62.81 - Muscle Weakness (Generalized), R35.0 - Frequency of Micturition  R39.14 - Feeling of Incomplete Bladder Emptying  N81.10 - Cystocele, Unspecified, K59.09 - Other Constipation  K62.3 - Rectal Prolapse  R19.7 - Diarrhea, Unspecified, and R10.30 - Lower Abdominal Pain, Unspecified  N94.10 - Unspecified Dyspareunia  L90.5 - Scar Conditions and Fibrosis of Skin   Date of Evaluation 24    Additional Pertinent History Keeley Garrido has a past medical history of Asthma, Cancer (HCC), Hyperlipidemia, Kidney stones, PONV (postoperative nausea and vomiting), and Thyroid disease.    Keeley Garrido has a past surgical history that includes Hysterectomy (); Lithotripsy (); Thyroidectomy (); EGD (2020); Colonoscopy (2020); Cystoscopy (Right, 2021); and Cholecystectomy (2021).      Functional Outcome Measure Used [facility]   Functional Outcome Score 17 (24)       Insurance: Primary: Payor: [facility]

## 2024-10-17 ENCOUNTER — TELEPHONE (OUTPATIENT)
Dept: SURGERY | Age: 39
End: 2024-10-17

## 2024-10-17 ENCOUNTER — OFFICE VISIT (OUTPATIENT)
Dept: SURGERY | Age: 39
End: 2024-10-17
Payer: COMMERCIAL

## 2024-10-17 VITALS
DIASTOLIC BLOOD PRESSURE: 60 MMHG | WEIGHT: 120 LBS | OXYGEN SATURATION: 98 % | TEMPERATURE: 98.3 F | HEART RATE: 65 BPM | BODY MASS INDEX: 19.29 KG/M2 | HEIGHT: 66 IN | SYSTOLIC BLOOD PRESSURE: 115 MMHG

## 2024-10-17 DIAGNOSIS — R10.30 LOWER ABDOMINAL PAIN: Primary | ICD-10-CM

## 2024-10-17 PROCEDURE — 99214 OFFICE O/P EST MOD 30 MIN: CPT | Performed by: SURGERY

## 2024-10-17 ASSESSMENT — ENCOUNTER SYMPTOMS
CONSTIPATION: 0
ABDOMINAL PAIN: 1
NAUSEA: 0

## 2024-10-17 NOTE — PROGRESS NOTES
MD [address] [address] DR GENERAL SURGERY  General Surgery  Clinic  Note    Pt Name: Keeley Garrido  Medical Record Number: [id number]  Date of Birth 1985   Today's Date: 10/17/2024      ASSESSMENT       ICD-10-CM    1. Lower abdominal pain  R10.30            PLAN   I reviewed all of the imaging.  I do not see any etiology for her pain no hernia.  Patient is adamant that when her laparoscopic cholecystectomy was done outside facility that there is some sort of a gush of air that was concerning.  She feels that what ever that was is causing the problem.  She did not have this pain before her laparoscopic cholecystectomy.  As mentioned before I do not see any source for her pain.  There is no hernia appreciable on exam or on CT scan.  Her operating surgeon has returned back to the outside facility and I feel it may be a second opinion by him since he was in the operating room would be the best course of action.  Patient is in agreement with this plan.  I will refer her back to operating surgeon  SUBJECTIVE   Keeley is doing about the same continues to have abdominal pain, today she complains that it is in her right abdomen just adjacent to her incision.  She is constant there is a leak and is always pulling.  There are no alleviating factors.  She rates it as moderate to severe in nature..  I reviewed her documentation from the OB/GYN, they do not feel her small ovarian cyst or any part of her pain.  She does elicit and one of her notices this pain is reproduced with sex however this is not mentioned today.        Review of Systems   Gastrointestinal:  Positive for abdominal pain. Negative for constipation and nausea.   Genitourinary: Negative.        CURRENT MEDICATIONS     Current Outpatient Medications on File Prior to Visit   Medication Sig Dispense Refill    ibuprofen (ADVIL;MOTRIN) 200 MG tablet Take 1 tablet by mouth every 6 hours as needed for Pain      calcium carbonate (TUMS) 500 MG chewable

## 2024-10-17 NOTE — TELEPHONE ENCOUNTER
Patient referred by to Dr. Penny at [facility].  The surgeon that did her gallbladder surgery.  Records faxed.  Appointment made for 10-

## 2024-10-21 ENCOUNTER — OFFICE VISIT (OUTPATIENT)
Age: 39
End: 2024-10-21
Payer: COMMERCIAL

## 2024-10-21 VITALS
DIASTOLIC BLOOD PRESSURE: 62 MMHG | WEIGHT: 118.2 LBS | HEIGHT: 66 IN | SYSTOLIC BLOOD PRESSURE: 112 MMHG | BODY MASS INDEX: 18.99 KG/M2 | HEART RATE: 77 BPM

## 2024-10-21 DIAGNOSIS — Z85.850 HISTORY OF THYROID CANCER: Primary | ICD-10-CM

## 2024-10-21 DIAGNOSIS — E07.9 THYROID DISEASE: ICD-10-CM

## 2024-10-21 DIAGNOSIS — E89.0 POSTSURGICAL HYPOTHYROIDISM: ICD-10-CM

## 2024-10-21 PROCEDURE — 99214 OFFICE O/P EST MOD 30 MIN: CPT | Performed by: INTERNAL MEDICINE

## 2024-10-21 RX ORDER — LEVOTHYROXINE SODIUM 88 UG/1
TABLET ORAL
Qty: 90 TABLET | Refills: 2 | Status: SHIPPED | OUTPATIENT
Start: 2024-10-21

## 2024-10-21 NOTE — PROGRESS NOTES
[facility] [place] [facility]  [facility] ENDOCRINOLOGY  [address]  [place] [address]  Dept: [phone]  Loc: [phone]     Visit Date: 10/21/2024    Keeley Garrido is a 39 y.o. female who presents today for:  Chief Complaint   Patient presents with    Hypothyroidism            Subjective:      HPI     Keeley Garrido is a 39 y.o. , female who comes for Follow up for hypothyroidism and history of thyroid cancer  Windy Hunt, APRN - CNP  Keeley Garrido was diagnosed with hypothyroidism 10 year(s) ago.   Etiology of hypothyroidism is Surgery.   Current therapy includes levothyroxine 88 mcg daily for 6 days and half tablet on the seventh day .  Recent labs showed normal TSH.  The patient reports intermittent dysphagia.  There is no pain.  No hoarseness of the voice.  She reports mild cold intolerance, dry skin, brittle hair and weight gain.  This patient did not receive radioactive iodine therapy following thyroidectomy.  Ultrasound from earlier this year showed no evidence of disease recurrence.  Thyroglobulin has not been repeated.  Past Medical History:   Diagnosis Date    Asthma     Cancer (HCC)     thyroid    Hyperlipidemia     Kidney stones     PONV (postoperative nausea and vomiting)     Thyroid disease       Past Surgical History:   Procedure Laterality Date    CHOLECYSTECTOMY  02/22/2021    [facility] Dr. Penny    COLONOSCOPY  12/18/2020    Dr. Thompson    CYSTOSCOPY Right 05/12/2021    CYSTOSCOPY, RIGHT URETEROSCOPY performed by Chip [facility]ountain MD at [facility] OR    EGD  11/30/2020    Dr. Thompson    HYSTERECTOMY (CERVIX STATUS UNKNOWN)  2007    LITHOTRIPSY  2007    THYROIDECTOMY  2014       Family History   Problem Relation Age of Onset    High Blood Pressure Mother     Cancer Father         melanoma    Cancer Maternal Grandfather         lungs    Colon Cancer Neg Hx     Esophageal Cancer Neg Hx     Liver Cancer Neg Hx     Rectal Cancer Neg Hx     Stomach Cancer Neg Hx

## 2024-10-24 ENCOUNTER — APPOINTMENT (OUTPATIENT)
Dept: PHYSICAL THERAPY | Age: 39
End: 2024-10-24
Payer: COMMERCIAL

## 2025-02-17 ENCOUNTER — OFFICE VISIT (OUTPATIENT)
Dept: FAMILY MEDICINE CLINIC | Age: 40
End: 2025-02-17

## 2025-02-17 VITALS
SYSTOLIC BLOOD PRESSURE: 98 MMHG | TEMPERATURE: 101.1 F | OXYGEN SATURATION: 97 % | HEART RATE: 106 BPM | DIASTOLIC BLOOD PRESSURE: 60 MMHG | RESPIRATION RATE: 16 BRPM

## 2025-02-17 DIAGNOSIS — J45.20 MILD INTERMITTENT ASTHMA WITHOUT COMPLICATION: ICD-10-CM

## 2025-02-17 DIAGNOSIS — C73 MALIGNANT TUMOR OF THYROID GLAND (HCC): ICD-10-CM

## 2025-02-17 DIAGNOSIS — E89.0 POSTSURGICAL HYPOTHYROIDISM: ICD-10-CM

## 2025-02-17 DIAGNOSIS — R50.9 FEVER, UNSPECIFIED FEVER CAUSE: Primary | ICD-10-CM

## 2025-02-17 LAB
INFLUENZA VIRUS A RNA: POSITIVE
INFLUENZA VIRUS B RNA: NEGATIVE

## 2025-02-17 RX ORDER — OSELTAMIVIR PHOSPHATE 75 MG/1
75 CAPSULE ORAL 2 TIMES DAILY
Qty: 10 CAPSULE | Refills: 0 | Status: SHIPPED | OUTPATIENT
Start: 2025-02-17 | End: 2025-02-22

## 2025-02-17 ASSESSMENT — PATIENT HEALTH QUESTIONNAIRE - PHQ9
SUM OF ALL RESPONSES TO PHQ QUESTIONS 1-9: 0
SUM OF ALL RESPONSES TO PHQ9 QUESTIONS 1 & 2: 0
2. FEELING DOWN, DEPRESSED OR HOPELESS: NOT AT ALL
1. LITTLE INTEREST OR PLEASURE IN DOING THINGS: NOT AT ALL

## 2025-02-20 ENCOUNTER — TELEPHONE (OUTPATIENT)
Dept: FAMILY MEDICINE CLINIC | Age: 40
End: 2025-02-20

## 2025-03-12 NOTE — TELEPHONE ENCOUNTER
Left message with the medical records department at [facility] to send over recent UA and culture results from the beginning of March per Gurvinder Hoffman
home

## 2025-04-03 ENCOUNTER — LAB (OUTPATIENT)
Dept: FAMILY MEDICINE CLINIC | Age: 40
End: 2025-04-03
Payer: COMMERCIAL

## 2025-04-03 DIAGNOSIS — Z85.850 HISTORY OF THYROID CANCER: ICD-10-CM

## 2025-04-03 DIAGNOSIS — E89.0 POSTSURGICAL HYPOTHYROIDISM: ICD-10-CM

## 2025-04-03 LAB
T4 FREE SERPL-MCNC: 1.4 NG/DL (ref 0.92–1.68)
TSH SERPL DL<=0.05 MIU/L-ACNC: 1.62 UIU/ML (ref 0.27–4.2)

## 2025-04-03 PROCEDURE — 36415 COLL VENOUS BLD VENIPUNCTURE: CPT | Performed by: NURSE PRACTITIONER

## 2025-04-05 ENCOUNTER — RESULTS FOLLOW-UP (OUTPATIENT)
Age: 40
End: 2025-04-05

## 2025-04-06 LAB — THYROGLOBULIN: NORMAL

## 2025-04-22 ENCOUNTER — OFFICE VISIT (OUTPATIENT)
Age: 40
End: 2025-04-22
Payer: COMMERCIAL

## 2025-04-22 VITALS
HEART RATE: 55 BPM | SYSTOLIC BLOOD PRESSURE: 98 MMHG | DIASTOLIC BLOOD PRESSURE: 70 MMHG | HEIGHT: 66 IN | BODY MASS INDEX: 18.68 KG/M2 | WEIGHT: 116.25 LBS

## 2025-04-22 DIAGNOSIS — E89.0 POSTSURGICAL HYPOTHYROIDISM: ICD-10-CM

## 2025-04-22 DIAGNOSIS — Z85.850 HISTORY OF THYROID CANCER: ICD-10-CM

## 2025-04-22 DIAGNOSIS — E07.9 THYROID DISEASE: Primary | ICD-10-CM

## 2025-04-22 PROCEDURE — 99214 OFFICE O/P EST MOD 30 MIN: CPT | Performed by: INTERNAL MEDICINE

## 2025-04-22 RX ORDER — LEVOTHYROXINE SODIUM 88 UG/1
TABLET ORAL
Qty: 90 TABLET | Refills: 2 | Status: SHIPPED | OUTPATIENT
Start: 2025-04-22

## 2025-04-22 ASSESSMENT — ENCOUNTER SYMPTOMS
TROUBLE SWALLOWING: 1
DIARRHEA: 1
SORE THROAT: 0
NAUSEA: 0
CHEST TIGHTNESS: 0
VOICE CHANGE: 0
SHORTNESS OF BREATH: 0
ABDOMINAL PAIN: 0
CONSTIPATION: 1

## 2025-04-22 NOTE — PROGRESS NOTES
[facility] [place] [facility]  [facility] ENDOCRINOLOGY  [address]  [address]  [place] [address]  Dept: [phone]  Loc: [phone]     Visit Date: 10/21/2024    Keeley Garrido is a 40 y.o. female who presents today for:  Chief Complaint   Patient presents with    Follow-up     hx thyroidectomy, thyroid disease 3m f/u         Subjective:      Keeley Garrido is a 40 y.o. , female who comes for Follow up for hypothyroidism and history of thyroid cancer  PCP: Windy Hunt, SILVIO - CNP    Keeley was diagnosed with postoperative hypothyroidism approximately 10 years ago s/p total thyroidectomy for stage I papillary thyroid cancer.  She underwent total thyroidectomy and reimplantation of the right inferior parathyroid gland into the right sternocleidomastoid muscle on 1/27/2015 for papillary thyroid cancer with a right upper lobe of the thyroid gland measuring 7 mm, pT1a NX, there was also a 4 mm left lobe focus of papillary thyroid cancer.  She did not have postoperative radioactive iodine therapy.  Her daughter also has a history of papillary thyroid cancer diagnosed at age 12.  She is currently on levothyroxine 88 mcg 6 days weekly, with 1/2 tablet (44 mcg) on Sundays.  Last thyroid imaging was completed in January 2024 and was unremarkable.  She does have ongoing intermittent dysphagia, poor appetite and p.o. intake.  Notes no significant weight loss, however she does have difficulty keeping weight on.  She denies any throat pain, throat or neck pain, hoarseness of voice.  She has ongoing combination of heat intolerance, dry skin, brittle hair.  Unfortunately, she has had a significant increase in night sweats and palpitations over the past 6 months.  She has been to see OB/GYN and has been diagnosed with being in perimenopause.  She has a history of hysterectomy approximately 25 years ago, however does have her ovaries.    Past Medical History:   Diagnosis Date    Asthma

## 2025-07-06 ENCOUNTER — HOSPITAL ENCOUNTER (EMERGENCY)
Age: 40
Discharge: HOME | End: 2025-07-06
Payer: COMMERCIAL

## 2025-07-06 VITALS
HEIGHT: 66 IN | HEART RATE: 64 BPM | WEIGHT: 115 LBS | DIASTOLIC BLOOD PRESSURE: 60 MMHG | TEMPERATURE: 98 F | BODY MASS INDEX: 18.48 KG/M2 | OXYGEN SATURATION: 97 % | SYSTOLIC BLOOD PRESSURE: 112 MMHG | RESPIRATION RATE: 18 BRPM

## 2025-07-06 DIAGNOSIS — K08.89 PAIN, DENTAL: Primary | ICD-10-CM

## 2025-07-06 PROCEDURE — 96372 THER/PROPH/DIAG INJ SC/IM: CPT

## 2025-07-06 PROCEDURE — 6370000000 HC RX 637 (ALT 250 FOR IP)

## 2025-07-06 PROCEDURE — 6360000002 HC RX W HCPCS

## 2025-07-06 PROCEDURE — 99284 EMERGENCY DEPT VISIT MOD MDM: CPT

## 2025-07-06 RX ORDER — HYDROCODONE BITARTRATE AND ACETAMINOPHEN 5; 325 MG/1; MG/1
1 TABLET ORAL EVERY 8 HOURS PRN
Qty: 9 TABLET | Refills: 0 | Status: SHIPPED | OUTPATIENT
Start: 2025-07-06 | End: 2025-07-09

## 2025-07-06 RX ORDER — KETOROLAC TROMETHAMINE 30 MG/ML
30 INJECTION, SOLUTION INTRAMUSCULAR; INTRAVENOUS ONCE
Status: COMPLETED | OUTPATIENT
Start: 2025-07-06 | End: 2025-07-06

## 2025-07-06 RX ORDER — HYDROCODONE BITARTRATE AND ACETAMINOPHEN 5; 325 MG/1; MG/1
1 TABLET ORAL ONCE
Status: COMPLETED | OUTPATIENT
Start: 2025-07-06 | End: 2025-07-06

## 2025-07-06 RX ADMIN — Medication 5 ML: at 20:02

## 2025-07-06 RX ADMIN — HYDROCODONE BITARTRATE AND ACETAMINOPHEN 1 TABLET: 5; 325 TABLET ORAL at 19:55

## 2025-07-06 RX ADMIN — KETOROLAC TROMETHAMINE 30 MG: 30 INJECTION, SOLUTION INTRAMUSCULAR at 19:56

## 2025-07-06 ASSESSMENT — PAIN - FUNCTIONAL ASSESSMENT: PAIN_FUNCTIONAL_ASSESSMENT: 0-10

## 2025-07-06 ASSESSMENT — PAIN SCALES - GENERAL: PAINLEVEL_OUTOF10: 7

## 2025-07-06 NOTE — ED TRIAGE NOTES
Pt to ED c/o dental pain. Pt states this started 3 days ago and has not been to her dentist yet for it. Pt states she has a filling where the infection is. Pt states she took 4 of her sons antibiotics he had left over. Pt states her pain is a 7/10. Pt A&O. VSS

## 2025-07-06 NOTE — ED PROVIDER NOTES
[facility] EMERGENCY DEPARTMENT      EMERGENCY MEDICINE     Pt Name: Keeley Garrido  MRN: [id number]  Birthdate 1985  Date of evaluation: 7/6/2025  Provider: Page Schmitt PA-C    CHIEF COMPLAINT       Chief Complaint   Patient presents with    Dental Pain     HISTORY OF PRESENT ILLNESS   Keeley Garrido is a pleasant 40 y.o. female who presents to the emergency department from {Blank multiple:19197::\"from home\",\"from nursing home\",\"from assisted living facility\",\"as a transfer from\",\"***\"}, {Blank multiple:19197::\"by private vehicle\",\"brought in by EMS\",\"as a walk in to the ED lobby\",\"***\"} for evaluation of ***.    PASTMEDICAL HISTORY     Past Medical History:   Diagnosis Date    Asthma     Cancer (HCC)     thyroid    Hyperlipidemia     Kidney stones     PONV (postoperative nausea and vomiting)     Thyroid disease        Patient Active Problem List   Diagnosis Code    Malignant tumor of thyroid gland (HCC) C73    Kidney stones N20.0    Chronic pain G89.29    Asthma J45.909    Hyperlipidemia E78.5    Thyroid disease E07.9    Hx of thyroidectomy Z98.890, Z90.89     SURGICAL HISTORY       Past Surgical History:   Procedure Laterality Date    CHOLECYSTECTOMY  02/22/2021    [facility] Dr. Penny    COLONOSCOPY  12/18/2020    Dr. Thompson    CYSTOSCOPY Right 05/12/2021    CYSTOSCOPY, RIGHT URETEROSCOPY performed by Chip Fountain MD at [facility] OR    EGD  11/30/2020    Dr. Thompson    HYSTERECTOMY (CERVIX STATUS UNKNOWN)  2007    LITHOTRIPSY  2007    THYROIDECTOMY  2014       CURRENT MEDICATIONS       Previous Medications    ALBUTEROL SULFATE HFA (VENTOLIN HFA) 108 (90 BASE) MCG/ACT INHALER    Inhale 2 puffs into the lungs every 4 hours as needed for Wheezing    CALCIUM CARBONATE (TUMS) 500 MG CHEWABLE TABLET    Take 1 tablet by mouth as needed for Heartburn    IBUPROFEN (ADVIL;MOTRIN) 200 MG TABLET    Take 1 tablet by mouth every 6 hours as needed for Pain    LEVOTHYROXINE (SYNTHROID) 88 MCG TABLET    1 TABLET DAILY AVELINA   Tonsils: No tonsillar abscesses.   Eyes:      Conjunctiva/sclera: Conjunctivae normal.      Pupils: Pupils are equal, round, and reactive to light.   Cardiovascular:      Rate and Rhythm: Normal rate and regular rhythm.      Pulses: Normal pulses.      Heart sounds: Normal heart sounds.   Pulmonary:      Effort: Pulmonary effort is normal.      Breath sounds: Normal breath sounds.   Musculoskeletal:         General: No swelling.      Cervical back: Normal range of motion.   Neurological:      General: No focal deficit present.      Mental Status: She is alert.   Psychiatric:         Mood and Affect: Mood normal.         FORMAL DIAGNOSTIC RESULTS     RADIOLOGY: Interpretation per the Radiologist below, if available at the time of this note (none if blank):    No orders to display       LABS: (none if blank)  Labs Reviewed - No data to display    (Any cultures that may have been sent were not resulted at the time of this patient visit)    MEDICAL DECISION MAKING / ED COURSE:     1) Number and Complexity of Problems            Problem List This Visit:         Chief Complaint   Patient presents with    Dental Pain          Differential Diagnosis includes (but not limited to):  Epiglottitis, keisha's angina, retropharyngeal abscess/cellulitis, parapharyngeal abscess, peritonsillar abscess, mononucleosis, carotid dissection/aneurysm, alveolar osteitis (dry socket), pharyngitis, URI, foreign body aspiration or ingestion, trauma, dental cavitis, post-extraction pain, TMJ pain             Pertinent Comorbid Conditions:    Reviewed per chart    2)  Data Reviewed (none if left blank)          My Independent interpretations:     EKG:      None    Imaging: None    Labs:      None                 Decision Rules/Clinical Scores utilized:  None            External Documentation Reviewed:         Previous patient encounter documents & history available on EMR was reviewed See MDM             See Formal Diagnostic Results above for

## 2025-07-07 SDOH — ECONOMIC STABILITY: FOOD INSECURITY: WITHIN THE PAST 12 MONTHS, YOU WORRIED THAT YOUR FOOD WOULD RUN OUT BEFORE YOU GOT MONEY TO BUY MORE.: NEVER TRUE

## 2025-07-07 SDOH — ECONOMIC STABILITY: FOOD INSECURITY: WITHIN THE PAST 12 MONTHS, THE FOOD YOU BOUGHT JUST DIDN'T LAST AND YOU DIDN'T HAVE MONEY TO GET MORE.: NEVER TRUE

## 2025-07-07 SDOH — ECONOMIC STABILITY: TRANSPORTATION INSECURITY
IN THE PAST 12 MONTHS, HAS LACK OF TRANSPORTATION KEPT YOU FROM MEETINGS, WORK, OR FROM GETTING THINGS NEEDED FOR DAILY LIVING?: NO

## 2025-07-07 SDOH — ECONOMIC STABILITY: INCOME INSECURITY: IN THE LAST 12 MONTHS, WAS THERE A TIME WHEN YOU WERE NOT ABLE TO PAY THE MORTGAGE OR RENT ON TIME?: NO

## 2025-07-07 SDOH — ECONOMIC STABILITY: TRANSPORTATION INSECURITY
IN THE PAST 12 MONTHS, HAS THE LACK OF TRANSPORTATION KEPT YOU FROM MEDICAL APPOINTMENTS OR FROM GETTING MEDICATIONS?: NO

## 2025-07-07 NOTE — DISCHARGE INSTRUCTIONS
Take your medication as indicated and prescribed.  If you are given an antibiotic, then make sure you get the prescription filled and take the antibiotics until finished.  Drink plenty of water while taking the antibiotics.  Avoid drinking alcohol or drinks that have caffeine in it while taking antibiotics.       For pain use ibuprofen (Motrin / Advil) or acetaminophen (Tylenol), unless prescribed medications that have acetaminophen in it.  You can take over the counter acetaminophen tablets (1 - 2 tablets of the 500-mg strength every 6 hours) or ibuprofen tablets (2 tablets every 4 hours).    PLEASE RETURN TO THE EMERGENCY DEPARTMENT IMMEDIATELY for worsening symptoms, swelling to your face, redness on your face, drainage from the tooth, or if you develop any concerning symptoms such as: high fever not relieved by acetaminophen (Tylenol) and/or ibuprofen (Motrin / Advil), chills, shortness of breath, chest pain, feeling of your heart fluttering or racing, persistent nausea and/or vomiting, vomiting up blood, blood in your stool, numbness, loss of consciousness, weakness or tingling in the arms or legs or change in color of the extremities, changes in mental status, persistent headache, blurry vision, loss of bladder / bowel control, unable to follow up with your physician, or other any other care or concern.

## 2025-07-10 ENCOUNTER — OFFICE VISIT (OUTPATIENT)
Dept: FAMILY MEDICINE CLINIC | Age: 40
End: 2025-07-10
Payer: COMMERCIAL

## 2025-07-10 VITALS
WEIGHT: 111.6 LBS | HEIGHT: 66 IN | RESPIRATION RATE: 16 BRPM | DIASTOLIC BLOOD PRESSURE: 60 MMHG | OXYGEN SATURATION: 95 % | SYSTOLIC BLOOD PRESSURE: 96 MMHG | BODY MASS INDEX: 17.94 KG/M2 | TEMPERATURE: 98.3 F | HEART RATE: 76 BPM

## 2025-07-10 DIAGNOSIS — Z12.31 ENCOUNTER FOR SCREENING MAMMOGRAM FOR MALIGNANT NEOPLASM OF BREAST: ICD-10-CM

## 2025-07-10 DIAGNOSIS — Z90.89 HX OF THYROIDECTOMY: ICD-10-CM

## 2025-07-10 DIAGNOSIS — Z00.00 ENCOUNTER FOR WELL ADULT EXAM WITHOUT ABNORMAL FINDINGS: Primary | ICD-10-CM

## 2025-07-10 DIAGNOSIS — Z98.890 HX OF THYROIDECTOMY: ICD-10-CM

## 2025-07-10 DIAGNOSIS — C73 MALIGNANT TUMOR OF THYROID GLAND (HCC): ICD-10-CM

## 2025-07-10 DIAGNOSIS — Z13.1 DIABETES MELLITUS SCREENING: ICD-10-CM

## 2025-07-10 DIAGNOSIS — Z13.220 LIPID SCREENING: ICD-10-CM

## 2025-07-10 LAB
ALBUMIN SERPL BCG-MCNC: 4.4 G/DL (ref 3.4–4.9)
ALP SERPL-CCNC: 78 U/L (ref 38–126)
ALT SERPL W/O P-5'-P-CCNC: 65 U/L (ref 10–35)
ANION GAP SERPL CALC-SCNC: 16 MEQ/L (ref 8–16)
AST SERPL-CCNC: 37 U/L (ref 10–35)
BASOPHILS ABSOLUTE: 0 THOU/MM3 (ref 0–0.1)
BASOPHILS NFR BLD AUTO: 0.4 %
BILIRUB SERPL-MCNC: 0.5 MG/DL (ref 0.3–1.2)
BUN SERPL-MCNC: 5 MG/DL (ref 8–23)
CALCIUM SERPL-MCNC: 9.1 MG/DL (ref 8.6–10)
CHLORIDE SERPL-SCNC: 102 MEQ/L (ref 98–111)
CHOLESTEROL, FASTING: 218 MG/DL (ref 100–199)
CO2 SERPL-SCNC: 20 MEQ/L (ref 22–29)
CREAT SERPL-MCNC: 0.8 MG/DL (ref 0.5–0.9)
DEPRECATED RDW RBC AUTO: 45.4 FL (ref 35–45)
EOSINOPHIL NFR BLD AUTO: 1.6 %
EOSINOPHILS ABSOLUTE: 0.1 THOU/MM3 (ref 0–0.4)
ERYTHROCYTE [DISTWIDTH] IN BLOOD BY AUTOMATED COUNT: 13.5 % (ref 11.5–14.5)
GFR SERPL CREATININE-BSD FRML MDRD: > 90 ML/MIN/1.73M2
GLUCOSE SERPL-MCNC: 89 MG/DL (ref 74–109)
HCT VFR BLD AUTO: 39.6 % (ref 37–47)
HDLC SERPL-MCNC: 48 MG/DL
HGB BLD-MCNC: 13.8 GM/DL (ref 12–16)
IMM GRANULOCYTES # BLD AUTO: 0.03 THOU/MM3 (ref 0–0.07)
IMM GRANULOCYTES NFR BLD AUTO: 0.4 %
LDLC SERPL CALC-MCNC: 135 MG/DL
LYMPHOCYTES ABSOLUTE: 1.8 THOU/MM3 (ref 1–4.8)
LYMPHOCYTES NFR BLD AUTO: 21.7 %
MCH RBC QN AUTO: 31.7 PG (ref 26–33)
MCHC RBC AUTO-ENTMCNC: 34.8 GM/DL (ref 32.2–35.5)
MCV RBC AUTO: 91 FL (ref 81–99)
MONOCYTES ABSOLUTE: 0.6 THOU/MM3 (ref 0.4–1.3)
MONOCYTES NFR BLD AUTO: 7.6 %
NEUTROPHILS ABSOLUTE: 5.7 THOU/MM3 (ref 1.8–7.7)
NEUTROPHILS NFR BLD AUTO: 68.3 %
NRBC BLD AUTO-RTO: 0 /100 WBC
PLATELET # BLD AUTO: 216 THOU/MM3 (ref 130–400)
PMV BLD AUTO: 10.7 FL (ref 9.4–12.4)
POTASSIUM SERPL-SCNC: 4.1 MEQ/L (ref 3.5–5.2)
PROT SERPL-MCNC: 6.9 G/DL (ref 6.4–8.3)
RBC # BLD AUTO: 4.35 MILL/MM3 (ref 4.2–5.4)
SODIUM SERPL-SCNC: 138 MEQ/L (ref 135–145)
TRIGLYCERIDE, FASTING: 175 MG/DL (ref 0–199)
WBC # BLD AUTO: 8.3 THOU/MM3 (ref 4.8–10.8)

## 2025-07-10 PROCEDURE — 99396 PREV VISIT EST AGE 40-64: CPT | Performed by: NURSE PRACTITIONER

## 2025-07-10 PROCEDURE — 36415 COLL VENOUS BLD VENIPUNCTURE: CPT | Performed by: NURSE PRACTITIONER

## 2025-07-10 NOTE — PROGRESS NOTES
Health Maintenance Due   Topic Date Due    Hepatitis B vaccine (1 of 3 - 19+ 3-dose series) Never done    DTaP/Tdap/Td vaccine (1 - Tdap) Never done    Pneumococcal 0-49 years Vaccine (1 of 2 - PCV) Never done    Breast cancer screen  Never done    COVID-19 Vaccine (1 - 2024-25 season) Never done      Patient would like a manual breast exam to see if she has issues before ordering the mammogram.

## 2025-07-10 NOTE — PROGRESS NOTES
Well Adult Note  Name: Keeley Garrido Today’s Date: 2025   MRN: [id number] Sex: Female   Age: 40 y.o. Ethnicity: Non- / Non    : 1985 Race: White (non-)      Keeley Garrido is here for a well adult exam.       Assessment & Plan   Encounter for well adult exam without abnormal findings  Overall unremarkable well exam   See chronic conditions below.   Encouraged to obtain 8 hours of sleep, 30 minutes of daily exercise. Drink 64 ounces of water daily. Follow a healthy well balanced diet.     -     Lipid, Fasting; Future  -     CBC with Auto Differential; Future  Lipid screening  -     Lipid, Fasting; Future  Malignant tumor of thyroid gland (HCC)  Hx of thyroidectomy  Stable. Continue levothyroxine 88 mcg daily.   Diabetes mellitus screening  -     Comprehensive Metabolic Panel; Future  Encounter for screening mammogram for malignant neoplasm of breast  -     [facility] PEPE DIGITAL SCREEN BILATERAL; Future        Return in 1 year (on 7/10/2026) for CPE (Physical Exam).       Subjective   History:  Chief Complaint   Patient presents with    Annual Exam     Patient presents for an annual physical exam.  She is fasting since 9:00 pm last night.   Patient would like a manual breast exam today, prior to ordering a mammogram.          Review of Systems   Constitutional:  Negative for appetite change, chills, fatigue and fever.   HENT:  Negative for congestion, ear discharge, sinus pressure, tinnitus and voice change.    Eyes:  Negative for pain, discharge, itching and visual disturbance.   Respiratory:  Negative for cough, choking, chest tightness, shortness of breath and wheezing.    Cardiovascular:  Negative for chest pain, palpitations and leg swelling.   Gastrointestinal:  Negative for abdominal distention, abdominal pain, constipation, nausea and vomiting.   Endocrine: Negative for cold intolerance and heat intolerance.   Genitourinary:  Negative for dysuria, hematuria, vaginal

## 2025-07-15 ENCOUNTER — RESULTS FOLLOW-UP (OUTPATIENT)
Dept: FAMILY MEDICINE CLINIC | Age: 40
End: 2025-07-15

## 2025-07-15 NOTE — RESULT ENCOUNTER NOTE
Labs reviewed.   Lipid panel is a bit elevated overall from 2 years ago.   I would recommend   Lifestyle Recommendations  AHA/ACC guidelines stress the importance of lifestyle modifications to lower cardiovascular disease risk. This includes eating a heart-healthy diet, regular aerobic exercises, maintenance of desirable body weight and avoidance of tobacco products.    Labs also show some possible dehydration, I would recommend being sure to drink atleast 64 ounces of water daily.       The 10-year ASCVD risk score (Maicej GAITAN, et al., 2019) is: 2.2%    Values used to calculate the score:      Age: 40 years      Sex: Female      Is Non- : No      Diabetic: No      Tobacco smoker: Yes      Systolic Blood Pressure: 96 mmHg      Is BP treated: No      HDL Cholesterol: 48 mg/dL      Total Cholesterol: 218 mg/dl

## 2025-07-18 ENCOUNTER — HOSPITAL ENCOUNTER (OUTPATIENT)
Dept: WOMENS IMAGING | Age: 40
Discharge: HOME | End: 2025-07-18
Payer: COMMERCIAL

## 2025-07-18 VITALS — WEIGHT: 115 LBS | HEIGHT: 66 IN | BODY MASS INDEX: 18.48 KG/M2

## 2025-07-18 DIAGNOSIS — Z12.31 ENCOUNTER FOR SCREENING MAMMOGRAM FOR MALIGNANT NEOPLASM OF BREAST: ICD-10-CM

## 2025-07-18 PROCEDURE — 77063 BREAST TOMOSYNTHESIS BI: CPT

## 2025-07-20 DIAGNOSIS — Z85.850 HISTORY OF THYROID CANCER: ICD-10-CM

## 2025-07-22 ASSESSMENT — ENCOUNTER SYMPTOMS
ABDOMINAL PAIN: 0
EYE PAIN: 0
COLOR CHANGE: 0
VOMITING: 0
CHOKING: 0
COUGH: 0
EYE DISCHARGE: 0
ABDOMINAL DISTENTION: 0
CONSTIPATION: 0
SHORTNESS OF BREATH: 0
SINUS PRESSURE: 0
VOICE CHANGE: 0
EYE ITCHING: 0
NAUSEA: 0
WHEEZING: 0
CHEST TIGHTNESS: 0
BACK PAIN: 0

## 2025-07-28 RX ORDER — LEVOTHYROXINE SODIUM 88 UG/1
TABLET ORAL
Qty: 90 TABLET | Refills: 0 | Status: SHIPPED | OUTPATIENT
Start: 2025-07-28

## 2025-08-11 ENCOUNTER — PATIENT MESSAGE (OUTPATIENT)
Age: 40
End: 2025-08-11

## 2025-08-11 DIAGNOSIS — Z85.850 HISTORY OF THYROID CANCER: ICD-10-CM

## 2025-08-11 RX ORDER — LEVOTHYROXINE SODIUM 88 UG/1
TABLET ORAL
Qty: 90 TABLET | Refills: 0 | OUTPATIENT
Start: 2025-08-11

## 2025-08-25 DIAGNOSIS — E89.0 POSTSURGICAL HYPOTHYROIDISM: Primary | ICD-10-CM

## 2025-08-25 RX ORDER — LEVOTHYROXINE SODIUM 88 UG/1
88 TABLET ORAL DAILY
Qty: 10 TABLET | Refills: 2 | Status: SHIPPED | OUTPATIENT
Start: 2025-08-25 | End: 2025-08-25 | Stop reason: SDUPTHER

## 2025-08-25 RX ORDER — LEVOTHYROXINE SODIUM 88 UG/1
88 TABLET ORAL DAILY
Qty: 90 TABLET | Refills: 1 | Status: SHIPPED | OUTPATIENT
Start: 2025-08-25

## (undated) DEVICE — SINGLE ACTION PUMPING SYSTEM

## (undated) DEVICE — Device

## (undated) DEVICE — DRAINBAG,ANTI-REFLUX TOWER,L/F,2000ML,LL: Brand: MEDLINE

## (undated) DEVICE — GUIDEWIRE URO L150CM DIA0.035IN STIFF NIT HYDRPHLC STR TIP

## (undated) DEVICE — 35 ML SYRINGE LUER-LOCK TIP: Brand: MONOJECT

## (undated) DEVICE — CATHETER URETH 16FR BLLN 5CC SIL ALLY W/ SIL HYDRGEL 2 W F

## (undated) DEVICE — CYSTO PACK: Brand: MEDLINE INDUSTRIES, INC.

## (undated) DEVICE — SOLUTION SCRB 4OZ 4% CHG H2O AIDED FOR PREOPERATIVE SKIN

## (undated) DEVICE — OCCLUSION BALLOON CATHETER: Brand: OCCLUDER

## (undated) DEVICE — Z DISCONTINUED NO SUB IDED TAPE UMB W1/8XL36IN WHT COT STRND NONRADIOPAQUE

## (undated) DEVICE — ADAPTER URO SCP UROLOK LL

## (undated) DEVICE — SOLUTION IV IRRIG WATER 1000ML POUR BRL 2F7114

## (undated) DEVICE — NITINOL STONE RETRIEVAL BASKET: Brand: ZERO TIP

## (undated) DEVICE — SHEET,DRAPE,3/4,53X77,STERILE: Brand: MEDLINE

## (undated) DEVICE — CAP INJ L0.75IN INTMIT

## (undated) DEVICE — DUAL LUMEN URETERAL CATHETER